# Patient Record
Sex: MALE | Race: WHITE | NOT HISPANIC OR LATINO | Employment: FULL TIME | URBAN - METROPOLITAN AREA
[De-identification: names, ages, dates, MRNs, and addresses within clinical notes are randomized per-mention and may not be internally consistent; named-entity substitution may affect disease eponyms.]

---

## 2018-03-29 ENCOUNTER — HOSPITAL ENCOUNTER (OUTPATIENT)
Dept: GENERAL RADIOLOGY | Facility: HOSPITAL | Age: 33
Discharge: HOME | End: 2018-03-29
Attending: INTERNAL MEDICINE | Admitting: INTERNAL MEDICINE

## 2018-03-29 DIAGNOSIS — J45.909 ASTHMATIC BRONCHITIS WITHOUT COMPLICATION, UNSPECIFIED ASTHMA SEVERITY, UNSPECIFIED WHETHER PERSISTENT: ICD-10-CM

## 2018-03-29 PROCEDURE — 71046 X-RAY EXAM CHEST 2 VIEWS: CPT

## 2021-09-14 ENCOUNTER — HOSPITAL ENCOUNTER (INPATIENT)
Facility: HOSPITAL | Age: 36
LOS: 3 days | Discharge: HOME | End: 2021-09-18
Attending: EMERGENCY MEDICINE | Admitting: INTERNAL MEDICINE

## 2021-09-14 ENCOUNTER — APPOINTMENT (OUTPATIENT)
Dept: GENERAL RADIOLOGY | Facility: HOSPITAL | Age: 36
End: 2021-09-14

## 2021-09-14 DIAGNOSIS — R52 INTRACTABLE PAIN: Primary | ICD-10-CM

## 2021-09-14 DIAGNOSIS — M54.50 ACUTE LOW BACK PAIN, UNSPECIFIED BACK PAIN LATERALITY, UNSPECIFIED WHETHER SCIATICA PRESENT: ICD-10-CM

## 2021-09-14 PROCEDURE — 99284 EMERGENCY DEPT VISIT MOD MDM: CPT

## 2021-09-14 PROCEDURE — 72072 X-RAY EXAM THORAC SPINE 3VWS: CPT

## 2021-09-14 PROCEDURE — 72110 X-RAY EXAM L-2 SPINE 4/>VWS: CPT

## 2021-09-14 RX ORDER — ONDANSETRON 2 MG/ML
4 INJECTION INTRAMUSCULAR; INTRAVENOUS ONCE
Status: COMPLETED | OUTPATIENT
Start: 2021-09-14 | End: 2021-09-15

## 2021-09-15 ENCOUNTER — APPOINTMENT (OUTPATIENT)
Dept: MRI IMAGING | Facility: HOSPITAL | Age: 36
End: 2021-09-15

## 2021-09-15 PROBLEM — F17.200 TOBACCO DEPENDENCE: Status: ACTIVE | Noted: 2021-09-15

## 2021-09-15 PROBLEM — R52 INTRACTABLE PAIN: Status: ACTIVE | Noted: 2021-09-15

## 2021-09-15 PROBLEM — R26.2 UNABLE TO AMBULATE: Status: ACTIVE | Noted: 2021-09-15

## 2021-09-15 PROBLEM — R52 ALTERATION IN MOBILITY ASSOCIATED WITH PAIN: Status: ACTIVE | Noted: 2021-09-15

## 2021-09-15 PROBLEM — M54.59 INTRACTABLE LOW BACK PAIN: Status: ACTIVE | Noted: 2021-09-15

## 2021-09-15 PROBLEM — Z78.9 ALTERATION IN MOBILITY ASSOCIATED WITH PAIN: Status: ACTIVE | Noted: 2021-09-15

## 2021-09-15 LAB
ANION GAP SERPL CALCULATED.3IONS-SCNC: 10.2 MMOL/L (ref 5–15)
BASOPHILS # BLD AUTO: 0.04 10*3/MM3 (ref 0–0.2)
BASOPHILS NFR BLD AUTO: 0.4 % (ref 0–1.5)
BUN SERPL-MCNC: 9 MG/DL (ref 6–20)
BUN/CREAT SERPL: 14.1 (ref 7–25)
CALCIUM SPEC-SCNC: 8.7 MG/DL (ref 8.6–10.5)
CHLORIDE SERPL-SCNC: 106 MMOL/L (ref 98–107)
CO2 SERPL-SCNC: 25.8 MMOL/L (ref 22–29)
CREAT SERPL-MCNC: 0.64 MG/DL (ref 0.76–1.27)
DEPRECATED RDW RBC AUTO: 43.4 FL (ref 37–54)
EOSINOPHIL # BLD AUTO: 0.29 10*3/MM3 (ref 0–0.4)
EOSINOPHIL NFR BLD AUTO: 2.5 % (ref 0.3–6.2)
ERYTHROCYTE [DISTWIDTH] IN BLOOD BY AUTOMATED COUNT: 12.2 % (ref 12.3–15.4)
GFR SERPL CREATININE-BSD FRML MDRD: 141 ML/MIN/1.73
GLUCOSE SERPL-MCNC: 91 MG/DL (ref 65–99)
HCT VFR BLD AUTO: 42.4 % (ref 37.5–51)
HGB BLD-MCNC: 13.5 G/DL (ref 13–17.7)
IMM GRANULOCYTES # BLD AUTO: 0.04 10*3/MM3 (ref 0–0.05)
IMM GRANULOCYTES NFR BLD AUTO: 0.4 % (ref 0–0.5)
LYMPHOCYTES # BLD AUTO: 2.87 10*3/MM3 (ref 0.7–3.1)
LYMPHOCYTES NFR BLD AUTO: 25.2 % (ref 19.6–45.3)
MCH RBC QN AUTO: 30.5 PG (ref 26.6–33)
MCHC RBC AUTO-ENTMCNC: 31.8 G/DL (ref 31.5–35.7)
MCV RBC AUTO: 95.9 FL (ref 79–97)
MONOCYTES # BLD AUTO: 0.83 10*3/MM3 (ref 0.1–0.9)
MONOCYTES NFR BLD AUTO: 7.3 % (ref 5–12)
NEUTROPHILS NFR BLD AUTO: 64.2 % (ref 42.7–76)
NEUTROPHILS NFR BLD AUTO: 7.31 10*3/MM3 (ref 1.7–7)
NRBC BLD AUTO-RTO: 0 /100 WBC (ref 0–0.2)
PLATELET # BLD AUTO: 203 10*3/MM3 (ref 140–450)
PMV BLD AUTO: 9.7 FL (ref 6–12)
POTASSIUM SERPL-SCNC: 3.9 MMOL/L (ref 3.5–5.2)
RBC # BLD AUTO: 4.42 10*6/MM3 (ref 4.14–5.8)
SARS-COV-2 RNA RESP QL NAA+PROBE: NOT DETECTED
SODIUM SERPL-SCNC: 142 MMOL/L (ref 136–145)
WBC # BLD AUTO: 11.38 10*3/MM3 (ref 3.4–10.8)

## 2021-09-15 PROCEDURE — 25010000002 ONDANSETRON PER 1 MG: Performed by: EMERGENCY MEDICINE

## 2021-09-15 PROCEDURE — 85025 COMPLETE CBC W/AUTO DIFF WBC: CPT | Performed by: PHYSICIAN ASSISTANT

## 2021-09-15 PROCEDURE — 80048 BASIC METABOLIC PNL TOTAL CA: CPT | Performed by: PHYSICIAN ASSISTANT

## 2021-09-15 PROCEDURE — 25010000002 HYDROMORPHONE PER 4 MG: Performed by: INTERNAL MEDICINE

## 2021-09-15 PROCEDURE — U0003 INFECTIOUS AGENT DETECTION BY NUCLEIC ACID (DNA OR RNA); SEVERE ACUTE RESPIRATORY SYNDROME CORONAVIRUS 2 (SARS-COV-2) (CORONAVIRUS DISEASE [COVID-19]), AMPLIFIED PROBE TECHNIQUE, MAKING USE OF HIGH THROUGHPUT TECHNOLOGIES AS DESCRIBED BY CMS-2020-01-R: HCPCS | Performed by: PHYSICIAN ASSISTANT

## 2021-09-15 PROCEDURE — G0378 HOSPITAL OBSERVATION PER HR: HCPCS

## 2021-09-15 PROCEDURE — 0 GADOBENATE DIMEGLUMINE 529 MG/ML SOLUTION: Performed by: INTERNAL MEDICINE

## 2021-09-15 PROCEDURE — A9577 INJ MULTIHANCE: HCPCS | Performed by: INTERNAL MEDICINE

## 2021-09-15 PROCEDURE — 25010000002 DEXAMETHASONE PER 1 MG: Performed by: PHYSICIAN ASSISTANT

## 2021-09-15 PROCEDURE — 25010000002 HYDROMORPHONE 1 MG/ML SOLUTION: Performed by: EMERGENCY MEDICINE

## 2021-09-15 PROCEDURE — 25010000002 KETOROLAC TROMETHAMINE PER 15 MG: Performed by: INTERNAL MEDICINE

## 2021-09-15 PROCEDURE — 63710000001 METHYLPREDNISOLONE 4 MG TABLET THERAPY PACK 21 EACH DISP PACK: Performed by: INTERNAL MEDICINE

## 2021-09-15 PROCEDURE — 72158 MRI LUMBAR SPINE W/O & W/DYE: CPT

## 2021-09-15 RX ORDER — DEXAMETHASONE SODIUM PHOSPHATE 10 MG/ML
10 INJECTION INTRAMUSCULAR; INTRAVENOUS ONCE
Status: COMPLETED | OUTPATIENT
Start: 2021-09-15 | End: 2021-09-15

## 2021-09-15 RX ORDER — VENLAFAXINE HYDROCHLORIDE 150 MG/1
150 CAPSULE, EXTENDED RELEASE ORAL
Status: DISCONTINUED | OUTPATIENT
Start: 2021-09-15 | End: 2021-09-18 | Stop reason: HOSPADM

## 2021-09-15 RX ORDER — METHYLPREDNISOLONE 4 MG/1
4 TABLET ORAL
Status: COMPLETED | OUTPATIENT
Start: 2021-09-17 | End: 2021-09-17

## 2021-09-15 RX ORDER — SODIUM CHLORIDE 0.9 % (FLUSH) 0.9 %
10 SYRINGE (ML) INJECTION AS NEEDED
Status: DISCONTINUED | OUTPATIENT
Start: 2021-09-15 | End: 2021-09-18 | Stop reason: HOSPADM

## 2021-09-15 RX ORDER — METHYLPREDNISOLONE 4 MG/1
4 TABLET ORAL
Status: DISCONTINUED | OUTPATIENT
Start: 2021-09-19 | End: 2021-09-18

## 2021-09-15 RX ORDER — KETOROLAC TROMETHAMINE 15 MG/ML
15 INJECTION, SOLUTION INTRAMUSCULAR; INTRAVENOUS EVERY 8 HOURS
Status: COMPLETED | OUTPATIENT
Start: 2021-09-15 | End: 2021-09-16

## 2021-09-15 RX ORDER — METHOCARBAMOL 750 MG/1
750 TABLET, FILM COATED ORAL ONCE
Status: COMPLETED | OUTPATIENT
Start: 2021-09-15 | End: 2021-09-15

## 2021-09-15 RX ORDER — METHYLPREDNISOLONE 4 MG/1
8 TABLET ORAL
Status: COMPLETED | OUTPATIENT
Start: 2021-09-16 | End: 2021-09-16

## 2021-09-15 RX ORDER — METHYLPREDNISOLONE 4 MG/1
24 TABLET ORAL ONCE
Status: COMPLETED | OUTPATIENT
Start: 2021-09-15 | End: 2021-09-15

## 2021-09-15 RX ORDER — KETOROLAC TROMETHAMINE 15 MG/ML
15 INJECTION, SOLUTION INTRAMUSCULAR; INTRAVENOUS EVERY 6 HOURS PRN
Status: DISCONTINUED | OUTPATIENT
Start: 2021-09-15 | End: 2021-09-15

## 2021-09-15 RX ORDER — METHOCARBAMOL 750 MG/1
750 TABLET, FILM COATED ORAL 3 TIMES DAILY
Status: DISCONTINUED | OUTPATIENT
Start: 2021-09-15 | End: 2021-09-18 | Stop reason: HOSPADM

## 2021-09-15 RX ORDER — ACETAMINOPHEN 325 MG/1
650 TABLET ORAL EVERY 4 HOURS PRN
Status: DISCONTINUED | OUTPATIENT
Start: 2021-09-15 | End: 2021-09-18 | Stop reason: HOSPADM

## 2021-09-15 RX ORDER — OXYCODONE AND ACETAMINOPHEN 7.5; 325 MG/1; MG/1
1 TABLET ORAL EVERY 4 HOURS PRN
Status: DISCONTINUED | OUTPATIENT
Start: 2021-09-15 | End: 2021-09-18 | Stop reason: HOSPADM

## 2021-09-15 RX ORDER — ONDANSETRON 2 MG/ML
4 INJECTION INTRAMUSCULAR; INTRAVENOUS EVERY 6 HOURS PRN
Status: DISCONTINUED | OUTPATIENT
Start: 2021-09-15 | End: 2021-09-18 | Stop reason: HOSPADM

## 2021-09-15 RX ORDER — METHYLPREDNISOLONE 4 MG/1
4 TABLET ORAL
Status: COMPLETED | OUTPATIENT
Start: 2021-09-16 | End: 2021-09-16

## 2021-09-15 RX ORDER — METHYLPREDNISOLONE 4 MG/1
4 TABLET ORAL
Status: DISCONTINUED | OUTPATIENT
Start: 2021-09-20 | End: 2021-09-18

## 2021-09-15 RX ORDER — HYDROMORPHONE HYDROCHLORIDE 1 MG/ML
0.5 INJECTION, SOLUTION INTRAMUSCULAR; INTRAVENOUS; SUBCUTANEOUS
Status: DISCONTINUED | OUTPATIENT
Start: 2021-09-15 | End: 2021-09-18

## 2021-09-15 RX ORDER — ALUMINA, MAGNESIA, AND SIMETHICONE 2400; 2400; 240 MG/30ML; MG/30ML; MG/30ML
15 SUSPENSION ORAL EVERY 6 HOURS PRN
Status: DISCONTINUED | OUTPATIENT
Start: 2021-09-15 | End: 2021-09-18 | Stop reason: HOSPADM

## 2021-09-15 RX ORDER — METHYLPREDNISOLONE 4 MG/1
4 TABLET ORAL
Status: COMPLETED | OUTPATIENT
Start: 2021-09-18 | End: 2021-09-18

## 2021-09-15 RX ORDER — ONDANSETRON 4 MG/1
4 TABLET, FILM COATED ORAL EVERY 6 HOURS PRN
Status: DISCONTINUED | OUTPATIENT
Start: 2021-09-15 | End: 2021-09-18 | Stop reason: HOSPADM

## 2021-09-15 RX ADMIN — KETOROLAC TROMETHAMINE 15 MG: 15 INJECTION, SOLUTION INTRAMUSCULAR; INTRAVENOUS at 15:08

## 2021-09-15 RX ADMIN — KETOROLAC TROMETHAMINE 15 MG: 15 INJECTION, SOLUTION INTRAMUSCULAR; INTRAVENOUS at 23:47

## 2021-09-15 RX ADMIN — METHOCARBAMOL TABLETS 750 MG: 750 TABLET, COATED ORAL at 16:09

## 2021-09-15 RX ADMIN — GADOBENATE DIMEGLUMINE 16 ML: 529 INJECTION, SOLUTION INTRAVENOUS at 11:48

## 2021-09-15 RX ADMIN — VENLAFAXINE HYDROCHLORIDE 150 MG: 150 CAPSULE, EXTENDED RELEASE ORAL at 12:33

## 2021-09-15 RX ADMIN — ONDANSETRON 4 MG: 2 INJECTION INTRAMUSCULAR; INTRAVENOUS at 00:29

## 2021-09-15 RX ADMIN — HYDROMORPHONE HYDROCHLORIDE 1 MG: 1 INJECTION, SOLUTION INTRAMUSCULAR; INTRAVENOUS; SUBCUTANEOUS at 00:29

## 2021-09-15 RX ADMIN — METHOCARBAMOL TABLETS 750 MG: 750 TABLET, COATED ORAL at 10:29

## 2021-09-15 RX ADMIN — HYDROMORPHONE HYDROCHLORIDE 1 MG: 1 INJECTION, SOLUTION INTRAMUSCULAR; INTRAVENOUS; SUBCUTANEOUS at 01:43

## 2021-09-15 RX ADMIN — OXYCODONE AND ACETAMINOPHEN 1 TABLET: 7.5; 325 TABLET ORAL at 17:45

## 2021-09-15 RX ADMIN — METHOCARBAMOL TABLETS 750 MG: 750 TABLET, COATED ORAL at 20:47

## 2021-09-15 RX ADMIN — DEXAMETHASONE SODIUM PHOSPHATE 10 MG: 10 INJECTION INTRAMUSCULAR; INTRAVENOUS at 04:40

## 2021-09-15 RX ADMIN — METHOCARBAMOL TABLETS 750 MG: 750 TABLET, COATED ORAL at 01:41

## 2021-09-15 RX ADMIN — OXYCODONE AND ACETAMINOPHEN 1 TABLET: 7.5; 325 TABLET ORAL at 23:47

## 2021-09-15 RX ADMIN — HYDROMORPHONE HYDROCHLORIDE 0.5 MG: 1 INJECTION, SOLUTION INTRAMUSCULAR; INTRAVENOUS; SUBCUTANEOUS at 09:22

## 2021-09-15 RX ADMIN — METHYLPREDNISOLONE 24 MG: 4 TABLET ORAL at 17:41

## 2021-09-15 RX ADMIN — OXYCODONE AND ACETAMINOPHEN 1 TABLET: 7.5; 325 TABLET ORAL at 12:33

## 2021-09-15 NOTE — ED NOTES
Patient was wearing facemask when I entered the room and throughout our encounter. I wore full protective equipment throughout this patient encounter including a N95, eye protection, and gloves. Hand hygiene was performed before donning protective equipment and after removal when leaving the room.       Arti Perez RN  09/15/21 0330

## 2021-09-15 NOTE — H&P
"    Patient Name:  Samir Enrique  YOB: 1985  MRN:  [id number]  Admit Date:  9/14/2021  Patient Care Team:  Rosalba Verde MD as PCP - General (Internal Medicine)      Subjective   History Present Illness     Chief Complaint   Patient presents with   • Back Pain       Mr. Enrique is a 36 y.o. male smoker with a benign medical history that presents to [facility] complaining of acute low back pain. Yesterday afternoon he reached down to pull off his sock when he heard a \"pop\" in his back. He had immediate pain to his low back. He has had difficulty moving due to the pain; he reports having to stay on the couch for \"5 hours\" until he could get to the ED. Denies injury/trauma/increased activity. Pain is present and rated 10/10 with any movement. It does radiate into his rt buttock but not down his leg. Denies recent fever, chest pain, shortness of breath, n/v, change in bowel/bladder, numbness/tingling. He was not able to ambulate in ED. He smokes 1 PPD; drinks etoh on occasion, not regularly. Denies illicit drug use.    Afebrile. HR controlled. BP stable. On room air. Covid neg. WBC 11.38, Hgb 13.5. BMP unremarkable. T spine xray: mild mid thoracic scoliosis convexed to rt; no acute findings. L spine xray: no acute findings      Review of Systems   Constitutional: Negative for fever.   HENT: Negative for congestion.    Respiratory: Negative for shortness of breath.    Cardiovascular: Negative for chest pain.   Gastrointestinal: Negative for nausea.   Genitourinary: Negative for difficulty urinating.   Musculoskeletal: Positive for back pain and gait problem.   Skin: Negative for rash.   Neurological: Negative for weakness and numbness.   Psychiatric/Behavioral: Negative for sleep disturbance.        Personal History     Past Medical History:   Diagnosis Date   • Bronchitis      Past Surgical History:   Procedure Laterality Date   • TYMPANOSTOMY TUBE PLACEMENT       No family history on " file.  Social History     Tobacco Use   • Smoking status: Current Every Day Smoker     Packs/day: 2.00   • Smokeless tobacco: Never Used   Substance Use Topics   • Alcohol use: Yes     Alcohol/week: 10.0 standard drinks     Types: 10 Cans of beer per week   • Drug use: No     No current facility-administered medications on file prior to encounter.     Current Outpatient Medications on File Prior to Encounter   Medication Sig Dispense Refill   • venlafaxine XR (EFFEXOR-XR) 150 MG 24 hr capsule Take 150 mg by mouth daily.     • Sulfamethoxazole-Trimethoprim (BACTRIM PO) Take  by mouth 2 (two) times a day.       Allergies   Allergen Reactions   • Ceclor [Cefaclor] Hives       Objective    Objective     Vital Signs  Temp:  [97.8 °F (36.6 °C)-98.1 °F (36.7 °C)] 97.8 °F (36.6 °C)  Heart Rate:  [63-80] 63  Resp:  [16] 16  BP: (122-134)/(75-82) 122/81  SpO2:  [95 %-98 %] 97 %  on   ;   Device (Oxygen Therapy): room air  Body mass index is 22.83 kg/m².    Physical Exam  Vitals and nursing note reviewed.   Constitutional:       General: He is not in acute distress.  HENT:      Head: Normocephalic.      Mouth/Throat:      Mouth: Mucous membranes are moist.   Eyes:      Conjunctiva/sclera: Conjunctivae normal.   Cardiovascular:      Rate and Rhythm: Normal rate and regular rhythm.   Pulmonary:      Effort: Pulmonary effort is normal. No respiratory distress.      Breath sounds: Normal breath sounds.   Abdominal:      General: Bowel sounds are normal.      Palpations: Abdomen is soft.   Musculoskeletal:      Cervical back: Neck supple.      Right lower leg: No edema.      Left lower leg: No edema.   Skin:     General: Skin is warm and dry.   Neurological:      Mental Status: He is alert and oriented to person, place, and time.      Motor: No weakness.   Psychiatric:         Mood and Affect: Mood normal.         Behavior: Behavior normal.         Results Review:  I reviewed the patient's new clinical results.  I reviewed the  patient's new imaging results and agree with the interpretation.  I reviewed the patient's other test results and agree with the interpretation  I personally viewed and interpreted the patient's EKG/Telemetry data    Lab Results (last 24 hours)     Procedure Component Value Units Date/Time    CBC & Differential [[id number]]  (Abnormal) Collected: 09/15/21 0325    Specimen: Blood Updated: 09/15/21 0344    Narrative:      The following orders were created for panel order CBC & Differential.  Procedure                               Abnormality         Status                     ---------                               -----------         ------                     CBC Auto Differential[[id number]]         Abnormal            Final result                 Please view results for these tests on the individual orders.    Basic Metabolic Panel [[id number]]  (Abnormal) Collected: 09/15/21 0325    Specimen: Blood Updated: 09/15/21 0349     Glucose 91 mg/dL      BUN 9 mg/dL      Creatinine 0.64 mg/dL      Sodium 142 mmol/L      Potassium 3.9 mmol/L      Chloride 106 mmol/L      CO2 25.8 mmol/L      Calcium 8.7 mg/dL      eGFR Non African Amer 141 mL/min/1.73      BUN/Creatinine Ratio 14.1     Anion Gap 10.2 mmol/L     Narrative:      GFR Normal >60  Chronic Kidney Disease <60  Kidney Failure <15      COVID PRE-OP / PRE-PROCEDURE SCREENING ORDER (NO ISOLATION) - Swab, Nasopharynx [[id number]]  (Normal) Collected: 09/15/21 0325    Specimen: Swab from Nasopharynx Updated: 09/15/21 0424    Narrative:      The following orders were created for panel order COVID PRE-OP / PRE-PROCEDURE SCREENING ORDER (NO ISOLATION) - Swab, Nasopharynx.  Procedure                               Abnormality         Status                     ---------                               -----------         ------                     COVID-19,[facility] ...[[id number]]  Normal              Final result                 Please view results for these tests on the  individual orders.    CBC Auto Differential [[id number]]  (Abnormal) Collected: 09/15/21 0325    Specimen: Blood Updated: 09/15/21 0344     WBC 11.38 10*3/mm3      RBC 4.42 10*6/mm3      Hemoglobin 13.5 g/dL      Hematocrit 42.4 %      MCV 95.9 fL      MCH 30.5 pg      MCHC 31.8 g/dL      RDW 12.2 %      RDW-SD 43.4 fl      MPV 9.7 fL      Platelets 203 10*3/mm3      Neutrophil % 64.2 %      Lymphocyte % 25.2 %      Monocyte % 7.3 %      Eosinophil % 2.5 %      Basophil % 0.4 %      Immature Grans % 0.4 %      Neutrophils, Absolute 7.31 10*3/mm3      Lymphocytes, Absolute 2.87 10*3/mm3      Monocytes, Absolute 0.83 10*3/mm3      Eosinophils, Absolute 0.29 10*3/mm3      Basophils, Absolute 0.04 10*3/mm3      Immature Grans, Absolute 0.04 10*3/mm3      nRBC 0.0 /100 WBC     COVID-19,[facility] CEPHEID/JOAN NP SWAB IN TRANSPORT MEDIA 8-12 HR TAT - Swab, Nasopharynx [[id number]]  (Normal) Collected: 09/15/21 0325    Specimen: Swab from Nasopharynx Updated: 09/15/21 0424     COVID19 Not Detected    Narrative:      Fact sheet for providers: https://www.fda.gov/media/987809/download     Fact sheet for patients: https://www.fda.gov/media/856798/download          Imaging Results (Last 24 Hours)     Procedure Component Value Units Date/Time    XR Spine Lumbar Complete 4+VW [[id number]] Collected: 09/15/21 0258     Updated: 09/15/21 0258    Narrative:        Patient: MARIS BLACKMON  Time Out: 02:57  Exam(s): FILM L SPINE 4+ VIEWS     EXAM:    XR Lumbosacral Spine, 4 or 5 Views    CLINICAL HISTORY:     Reason for exam: Lower back pain.    TECHNIQUE:    Frontal, lateral and oblique views of the lumbar spine.    COMPARISON:    No relevant prior studies available.    FINDINGS:    Vertebrae:  Unremarkable.  No fracture.  Normal alignment.    Sacrum coccyx:  Unremarkable as visualized.  No acute fracture.    Disc spaces:  No acute findings.  No significant narrowing.    Soft tissues:  Unremarkable.    IMPRESSION: No acute findings     Impression:          Electronically signed by Khurram Duran M.D. on 09-15-21 at 0257    XR Spine Thoracic 3 View [[id number]] Collected: 09/15/21 0256     Updated: 09/15/21 0256    Narrative:        Patient: MARIS ENRIQUE  Time Out: 02:56  Exam(s): FILM T SPINE     EXAM:    XR Thoracic Spine, 2 Views    CLINICAL HISTORY:     Reason for exam: Mid back pain.    TECHNIQUE:    Frontal and lateral views of the thoracic spine.    COMPARISON:    No relevant prior studies available.    FINDINGS:    Vertebrae:  Mild mid thoracic scoliosis convexed to the right.  No   fracture.  Normal alignment.    Disc spaces:  No acute findings.  No significant narrowing.    Soft tissues:  Unremarkable.    IMPRESSION:         No acute findings in the thoracic spine.      Impression:          Electronically signed by Khurram Duran M.D. on 09-15-21 at 0256              No orders to display        Assessment/Plan     Active Hospital Problems    Diagnosis  POA   • **Intractable low back pain [M54.5]  Yes   • Tobacco dependence [F17.200]  Yes   • Unable to ambulate [R26.2]  Yes      Resolved Hospital Problems   No resolved problems to display.       Mr. Enrique is a 36 y.o. male smoker with a benign medical history who is admitted for acute onset intractable low back pain    -MRI L spine. Analgesics as needed. Add toradol. PT eval. Xrays unremarkable  -Encourage smoking cessation. Offered nicotine patch but refusing at this time, no cravings  -Mild elevated WBC likely reactive. No signs/symptoms of infection. Will trend    · I discussed the patient's findings and my recommendations with patient and Dr. Reddy.    VTE Prophylaxis - SCDs.  Code Status - Full code.       RAYSHAWN Alvarado  [facility]  09/15/21  10:13 EDT

## 2021-09-15 NOTE — ED PROVIDER NOTES
The MERVAT and I have discussed this patients history, physical exam, and treatment plan. I have reviewed the documentation and personally had a face to face interaction with the patient. I affirm the documentation and agree with the treatment and plan.  The following note describes my personal findings    This patient is a 36-year-old male presenting to the emergency room complaining today of low back pain. The patient states that he tried to pull a sock off and felt significant pain in his lower back. He states that the pain is made worse by movement. He denies radiation of the pain into the legs, extremity weakness, groin numbness, urinary retention, or fecal incontinence.    Exam: This patient appears markedly uncomfortable but is currently without gross neurological deficit.  He is afebrile with stable vital signs and nontoxic in appearance.  Abdomen is soft and nontender, without rebound or guarding.  There is no tenderness to the thoracic or lumbar spines and he is without step-off or deformity noted.    Plan: We will make every attempt to get his pain under control with IV analgesia. Will obtain x-rays of the thoracic and lumbar spines. We will monitor and reassess following.      The patient was wearing a facemask upon entrance into the room and remained in such throughout their visit.  I was wearing PPE including a facemask, eye protection, as well as gloves at any point entering the room and throughout the visit     Elvin Vale MD  09/15/21 3511

## 2021-09-15 NOTE — CASE MANAGEMENT/SOCIAL WORK
Discharge Planning Assessment  [facility]     Patient Name: Samir Enrique  MRN: [id number]  Today's Date: 9/15/2021    Admit Date: 9/14/2021    Discharge Needs Assessment     Row Name 09/15/21 1409       Living Environment    Lives With  parent(s)    Current Living Arrangements  home/apartment/condo    Primary Care Provided by  self    Provides Primary Care For  no one    Family Caregiver if Needed  parent(s)    Quality of Family Relationships  helpful;involved;supportive    Able to Return to Prior Arrangements  yes       Resource/Environmental Concerns    Transportation Concerns  car, none       Transition Planning    Patient/Family Anticipates Transition to  home with family    Patient/Family Anticipated Services at Transition      Transportation Anticipated  family or friend will provide       Discharge Needs Assessment    Readmission Within the Last 30 Days  no previous admission in last 30 days    Equipment Currently Used at Home  none        Discharge Plan     Row Name 09/15/21 1410       Plan    Plan  Home with family support.    Patient/Family in Agreement with Plan  yes    Plan Comments  Spoke with the patient, verified curren tinformation and explained the role of the CCP. Patient said he lives with his parents and has family support. He's IADL and has no history with SNF/RH/HH. Patient plans to d/c home with family support. He denies needs for DME/RH/HH at this time. CCP will follow for appropriate d/c needs.        Continued Care and Services - Admitted Since 9/14/2021    Coordination has not been started for this encounter.         Demographic Summary     Row Name 09/15/21 1408       General Information    Admission Type  observation    Reason for Consult  discharge planning    Preferred Language  English     Used During This Interaction  no       Contact Information    Permission Granted to Share Info With  ;family/designee        Functional Status     Row Name  09/15/21 1408       Functional Status    Usual Activity Tolerance  good       Functional Status, IADL    Medications  independent    Meal Preparation  independent    Housekeeping  independent    Laundry  independent    Shopping  independent       Mental Status Summary    Recent Changes in Mental Status/Cognitive Functioning  no changes        Psychosocial     Row Name 09/15/21 1409       Intellectual Performance WDL    Level of Consciousness  Alert       Coping/Stress    Patient Personal Strengths  able to adapt    Sources of Support  parent(s)    Reaction to Health Status  accepting    Understanding of Condition and Treatment  adequate understanding of medical condition       Developmental Stage (Eriksson's)    Developmental Stage  Stage 7 (35-65 years/Middle Adulthood) Generativity vs. Stagnation        Abuse/Neglect    No documentation.       Legal    No documentation.       Substance Abuse    No documentation.       Patient Forms    No documentation.           Terri Purvis RN

## 2021-09-15 NOTE — ED PROVIDER NOTES
EMERGENCY DEPARTMENT ENCOUNTER    Room Number:  01/01  Date of encounter:  9/15/2021  PCP: Rosalba Verde MD  Historian: Patient      HPI:  Chief Complaint: Lower back pain  A complete HPI/ROS/PMH/PSH/SH/FH are unobtainable due to: Nothing    Context: Samir Enrique is a 36 y.o. male who presents to the ED c/o lower back pain that began just PTA.  Patient states he was bending over to pull sock off and felt a pop in his lower back.  Since that time he states he has been unable to move.  May still the pain is said to be tolerable with the slightest movement the pain is 10 out of 10, sharp, localized to the lower back.  The patient denies lower extremity weakness, numbness, tingling, saddle anesthesia, sciatica, urinary retention, bowel incontinence.  He denies fever, chills, IVD use, remote history of cancer.      PAST MEDICAL HISTORY  Active Ambulatory Problems     Diagnosis Date Noted   • No Active Ambulatory Problems     Resolved Ambulatory Problems     Diagnosis Date Noted   • No Resolved Ambulatory Problems     Past Medical History:   Diagnosis Date   • Bronchitis          PAST SURGICAL HISTORY  Past Surgical History:   Procedure Laterality Date   • TYMPANOSTOMY TUBE PLACEMENT           FAMILY HISTORY  No family history on file.      SOCIAL HISTORY  Social History     Socioeconomic History   • Marital status: Single     Spouse name: Not on file   • Number of children: Not on file   • Years of education: Not on file   • Highest education level: Not on file   Tobacco Use   • Smoking status: Current Every Day Smoker     Packs/day: 2.00   • Smokeless tobacco: Never Used   Substance and Sexual Activity   • Alcohol use: Yes     Alcohol/week: 10.0 standard drinks     Types: 10 Cans of beer per week   • Drug use: No         ALLERGIES  Ceclor [cefaclor]        REVIEW OF SYSTEMS  Review of Systems   Constitutional: Negative for chills and fever.   HENT: Negative.    Respiratory: Negative.    Cardiovascular: Negative.     Gastrointestinal: Negative.    Genitourinary: Negative.    Musculoskeletal: Positive for back pain. Negative for neck pain and neck stiffness.   Skin: Negative.    Neurological: Negative for dizziness and headaches.        All systems reviewed and negative except for those discussed in HPI.       PHYSICAL EXAM    I have reviewed the triage vital signs and nursing notes.    ED Triage Vitals   Temp Heart Rate Resp BP SpO2   09/14/21 2036 09/14/21 2034 09/14/21 2034 09/14/21 2034 09/14/21 2034   98.1 °F (36.7 °C) 80 16 133/75 98 %      Temp src Heart Rate Source Patient Position BP Location FiO2 (%)   -- -- -- -- --              Physical Exam  GENERAL: WDWN male appears uncomfortable but nontoxic  HENT: nares patent, face symmetric, mucous remains moist  EYES: no scleral icterus  CV: regular rhythm, regular rate, no rubs or gallop  RESPIRATORY: normal effort, lungs CTA B  ABDOMEN: soft, nontender  MUSCULOSKELETAL: TTP vicinity L3-S1, no step-off deformity.  Dorsiflexion plantarflexion of bilateral lower extremities 5 of 5 NEURO: alert and orient x4, moves all extremities, follows commands, gross sensation intact globally  SKIN: warm, dry        LAB RESULTS  No results found for this or any previous visit (from the past 24 hour(s)).    Ordered the above labs and independently reviewed the results.        RADIOLOGY  XR Spine Thoracic 3 View    Result Date: 9/15/2021  Patient: MARIS BLACKMON  Time Out: 02:56 Exam(s): FILM T SPINE EXAM:   XR Thoracic Spine, 2 Views CLINICAL HISTORY:    Reason for exam: Mid back pain. TECHNIQUE:   Frontal and lateral views of the thoracic spine. COMPARISON:   No relevant prior studies available. FINDINGS:   Vertebrae:  Mild mid thoracic scoliosis convexed to the right.  No fracture.  Normal alignment.   Disc spaces:  No acute findings.  No significant narrowing.   Soft tissues:  Unremarkable. IMPRESSION:       No acute findings in the thoracic spine.     Electronically signed by Khurram  SALUD Duran on 09-15-21 at 0256    XR Spine Lumbar Complete 4+VW    Result Date: 9/15/2021  Patient: MARIS BLACKMON  Time Out: 02:57 Exam(s): FILM L SPINE 4+ VIEWS EXAM:   XR Lumbosacral Spine, 4 or 5 Views CLINICAL HISTORY:    Reason for exam: Lower back pain. TECHNIQUE:   Frontal, lateral and oblique views of the lumbar spine. COMPARISON:   No relevant prior studies available. FINDINGS:   Vertebrae:  Unremarkable.  No fracture.  Normal alignment.   Sacrum coccyx:  Unremarkable as visualized.  No acute fracture.   Disc spaces:  No acute findings.  No significant narrowing.   Soft tissues:  Unremarkable. IMPRESSION: No acute findings    Electronically signed by Khurram Duran M.D. on 09-15-21 at 0257      I ordered the above noted radiological studies. Reviewed by me and discussed with radiologist.  See dictation for official radiology interpretation.      PROCEDURES    Procedures      MEDICATIONS GIVEN IN ER    Medications   sodium chloride 0.9 % flush 10 mL (has no administration in time range)   acetaminophen (TYLENOL) tablet 650 mg (has no administration in time range)   ondansetron (ZOFRAN) tablet 4 mg (has no administration in time range)     Or   ondansetron (ZOFRAN) injection 4 mg (has no administration in time range)   aluminum-magnesium hydroxide-simethicone (MAALOX MAX) 400-400-40 MG/5ML suspension 15 mL (has no administration in time range)   dexamethasone (DECADRON) injection 10 mg (has no administration in time range)   venlafaxine XR (EFFEXOR-XR) 24 hr capsule 150 mg (has no administration in time range)   HYDROmorphone (DILAUDID) injection 1 mg (1 mg Intravenous Given 9/15/21 0029)   ondansetron (ZOFRAN) injection 4 mg (4 mg Intravenous Given 9/15/21 0029)   HYDROmorphone (DILAUDID) injection 1 mg (1 mg Intravenous Given 9/15/21 0143)   methocarbamol (ROBAXIN) tablet 750 mg (750 mg Oral Given 9/15/21 0141)         PROGRESS, DATA ANALYSIS, CONSULTS, AND MEDICAL DECISION MAKING    All labs have been  independently reviewed by me.  All radiology studies have been reviewed by me and discussed with radiologist dictating the report.   EKG's independently viewed and interpreted by me.  Discussion below represents my analysis of pertinent findings related to patient's condition, differential diagnosis, treatment plan and final disposition.    DDx includes but is not limited to: Lumbosacral strain, herniated disc, spondylolisthesis, avulsion injury.  Will order an x-ray for further evaluation.  Please see below for course of care and timeline of events    ED Course as of Sep 15 0339   Wed Sep 15, 2021   0057 Patient unable to move or ambulate at this time.  Will order a second dose of IV Dilaudid as well as 750 mg of Robaxin.    [RC]   0230 Patient has had a second dose of Dilaudid, Robaxin and we have attempted to ambulate.  Patient is unable to get out of the bed at this time.  There is some concern  for herniated disc.  Given that the patient is unable to ambulate out of the emergency department within the emergency department I do not feel I can send him home with conservative measures.  We will place a call to medicine to discuss admission with the patient to be further evaluated and treated.    [RC]   0322 Patient states he missed his evening Effexor dose and is requesting it at this time.    [RC]      ED Course User Index  [RC] Gilmer Greer III, PA     Patient was placed in face mask in first look. Patient was wearing facemask when I entered the room and throughout our encounter. I wore full protective equipment throughout this patient encounter including a face mask, and gloves. Hand hygiene was performed before donning protective equipment and after removal when leaving the room.    AS OF 03:39 EDT VITALS:    BP - 133/75  HR - 80  TEMP - 98.1 °F (36.7 °C)  O2 SATS - 98%        DIAGNOSIS  Final diagnoses:   Intractable pain   Acute low back pain, unspecified back pain laterality, unspecified whether  sciatica present         DISPOSITION  ADMISSION    Discussed treatment plan and reason for admission with pt/family and admitting physician.  Pt/family voiced understanding of the plan for admission for further testing/treatment as needed.              Gilmer Greer III, PA  09/15/21 0330

## 2021-09-15 NOTE — ED NOTES
Patient presents to ED from home, notes he was in shower bent over and felt a pop to his middle back.  Patient notes pain to lumbar region, was a 10 out of 10, given fentanyl en route and 700 cc of fluid.  Patient is alert and oriented x4.  Denies any irregularities in bowel or bladder patterns.  Patient did have vagal response when transferring to stretcher, currently VSS.  NSR.  ABC's intact.  NAD noted. Patient wearing mask, nurse wearing mask, n95 and protective eyewear during care and assessment.  Hand hygiene performed prior to and post care.      Ruy Ghotra RN  09/14/21 2032

## 2021-09-15 NOTE — PLAN OF CARE
Goal Outcome Evaluation:           Progress: improving  Outcome Summary: 37 y/o Admitted for intractable lumbar pain. Pt on bed rest this shift. Pt voiding well per urinal. PIV saline locked. Pain managed via PO pills. Neuro checks WNL. MRI completed, see results. Medrol pack started. Educated pt on hospital policies. D/C plan pending. Will CTM.

## 2021-09-15 NOTE — PLAN OF CARE
Goal Outcome Evaluation:  Plan of Care Reviewed With: patient        Progress: no change  Outcome Summary: admitted from ED with intactable back pain that he reports is sharp pain in his mid lower back without radiation, moves all extremities well, no noted neuro deficits, dtv, call placed to internest for adm orders, discharge plans pending

## 2021-09-15 NOTE — ED NOTES
Nursing report ED to floor  Samir Enrique  36 y.o.  male    HPI (triage note):   Chief Complaint   Patient presents with   • Back Pain       Admitting doctor:   Cordell Marcelo MD    Admitting diagnosis:   The primary encounter diagnosis was Intractable pain. A diagnosis of Acute low back pain, unspecified back pain laterality, unspecified whether sciatica present was also pertinent to this visit.    Code status:   Current Code Status     Date Active Code Status Order ID Comments User Context       9/15/2021 0321 CPR [id number]  Scout Pete APRN ED     Advance Care Planning Activity      Questions for Current Code Status     Question Answer Comment    Code Status CPR     Medical Interventions (Level of Support Prior to Arrest) Full           Allergies:   Ceclor [cefaclor]    Weight:       09/15/21  0356   Weight: 75.8 kg (167 lb)       Most recent vitals:   Vitals:    09/14/21 2036 09/15/21 0341 09/15/21 0355 09/15/21 0356   BP:  134/82     Pulse:   72    Resp:       Temp: 98.1 °F (36.7 °C)      SpO2:   95%    Weight:    75.8 kg (167 lb)   Height:           Active LDAs/IV Access:   Lines, Drains & Airways    Active LDAs     Name:   Placement date:   Placement time:   Site:   Days:    Peripheral IV 09/14/21 2033 Anterior;Distal;Left;Upper Arm   09/14/21 2033    Arm   less than 1                Labs (abnormal labs have a star):   Labs Reviewed   BASIC METABOLIC PANEL - Abnormal; Notable for the following components:       Result Value    Creatinine 0.64 (*)     All other components within normal limits    Narrative:     GFR Normal >60  Chronic Kidney Disease <60  Kidney Failure <15     CBC WITH AUTO DIFFERENTIAL - Abnormal; Notable for the following components:    WBC 11.38 (*)     RDW 12.2 (*)     Neutrophils, Absolute 7.31 (*)     All other components within normal limits   COVID PRE-OP / PRE-PROCEDURE SCREENING ORDER (NO ISOLATION)    Narrative:     The following orders were created for panel order  COVID PRE-OP / PRE-PROCEDURE SCREENING ORDER (NO ISOLATION) - Swab, Nasopharynx.  Procedure                               Abnormality         Status                     ---------                               -----------         ------                     COVID-19, [facility]LENE IN-HOUSE ...[[id number]]                      In process                   Please view results for these tests on the individual orders.   COVID-19, MARLENE IN-HOUSE CEPHEID/JOAN, NP SWAB IN TRANSPORT MEDIA 8-12 HR TAT   CBC AND DIFFERENTIAL    Narrative:     The following orders were created for panel order CBC & Differential.  Procedure                               Abnormality         Status                     ---------                               -----------         ------                     CBC Auto Differential[[id number]]         Abnormal            Final result                 Please view results for these tests on the individual orders.       EKG:   No orders to display       Meds given in ED:   Medications   sodium chloride 0.9 % flush 10 mL (has no administration in time range)   acetaminophen (TYLENOL) tablet 650 mg (has no administration in time range)   ondansetron (ZOFRAN) tablet 4 mg (has no administration in time range)     Or   ondansetron (ZOFRAN) injection 4 mg (has no administration in time range)   aluminum-magnesium hydroxide-simethicone (MAALOX MAX) 400-400-40 MG/5ML suspension 15 mL (has no administration in time range)   dexamethasone (DECADRON) injection 10 mg (has no administration in time range)   venlafaxine XR (EFFEXOR-XR) 24 hr capsule 150 mg (has no administration in time range)   HYDROmorphone (DILAUDID) injection 1 mg (1 mg Intravenous Given 9/15/21 0029)   ondansetron (ZOFRAN) injection 4 mg (4 mg Intravenous Given 9/15/21 0029)   HYDROmorphone (DILAUDID) injection 1 mg (1 mg Intravenous Given 9/15/21 0143)   methocarbamol (ROBAXIN) tablet 750 mg (750 mg Oral Given 9/15/21 0141)       Imaging results:  XR Spine  Thoracic 3 View    Result Date: 9/15/2021  Electronically signed by Khurram Duran M.D. on 09-15-21 at 0256    XR Spine Lumbar Complete 4+VW    Result Date: 9/15/2021  Electronically signed by Khurram Duran M.D. on 09-15-21 at 0257      Ambulatory status:   Ad travis    Social issues:   Social History     Socioeconomic History   • Marital status: Single     Spouse name: Not on file   • Number of children: Not on file   • Years of education: Not on file   • Highest education level: Not on file   Tobacco Use   • Smoking status: Current Every Day Smoker     Packs/day: 2.00   • Smokeless tobacco: Never Used   Substance and Sexual Activity   • Alcohol use: Yes     Alcohol/week: 10.0 standard drinks     Types: 10 Cans of beer per week   • Drug use: No    Nursing report ED to floor     Gabriela Mustafa RN  09/15/21 0359

## 2021-09-16 LAB
DEPRECATED RDW RBC AUTO: 41.2 FL (ref 37–54)
ERYTHROCYTE [DISTWIDTH] IN BLOOD BY AUTOMATED COUNT: 11.8 % (ref 12.3–15.4)
HCT VFR BLD AUTO: 40.5 % (ref 37.5–51)
HGB BLD-MCNC: 13.2 G/DL (ref 13–17.7)
MCH RBC QN AUTO: 30.8 PG (ref 26.6–33)
MCHC RBC AUTO-ENTMCNC: 32.6 G/DL (ref 31.5–35.7)
MCV RBC AUTO: 94.4 FL (ref 79–97)
PLATELET # BLD AUTO: 212 10*3/MM3 (ref 140–450)
PMV BLD AUTO: 9.9 FL (ref 6–12)
RBC # BLD AUTO: 4.29 10*6/MM3 (ref 4.14–5.8)
WBC # BLD AUTO: 9.89 10*3/MM3 (ref 3.4–10.8)

## 2021-09-16 PROCEDURE — 97110 THERAPEUTIC EXERCISES: CPT

## 2021-09-16 PROCEDURE — 85027 COMPLETE CBC AUTOMATED: CPT | Performed by: NURSE PRACTITIONER

## 2021-09-16 PROCEDURE — 25010000002 KETOROLAC TROMETHAMINE PER 15 MG: Performed by: INTERNAL MEDICINE

## 2021-09-16 PROCEDURE — 99254 IP/OBS CNSLTJ NEW/EST MOD 60: CPT | Performed by: NURSE PRACTITIONER

## 2021-09-16 PROCEDURE — 97165 OT EVAL LOW COMPLEX 30 MIN: CPT | Performed by: OCCUPATIONAL THERAPIST

## 2021-09-16 PROCEDURE — 63710000001 METHYLPREDNISOLONE 4 MG TABLET THERAPY PACK 21 EACH DISP PACK: Performed by: INTERNAL MEDICINE

## 2021-09-16 PROCEDURE — 97161 PT EVAL LOW COMPLEX 20 MIN: CPT

## 2021-09-16 RX ADMIN — VENLAFAXINE HYDROCHLORIDE 150 MG: 150 CAPSULE, EXTENDED RELEASE ORAL at 08:50

## 2021-09-16 RX ADMIN — METHOCARBAMOL TABLETS 750 MG: 750 TABLET, COATED ORAL at 17:17

## 2021-09-16 RX ADMIN — KETOROLAC TROMETHAMINE 15 MG: 15 INJECTION, SOLUTION INTRAMUSCULAR; INTRAVENOUS at 06:28

## 2021-09-16 RX ADMIN — OXYCODONE AND ACETAMINOPHEN 1 TABLET: 7.5; 325 TABLET ORAL at 06:28

## 2021-09-16 RX ADMIN — METHOCARBAMOL TABLETS 750 MG: 750 TABLET, COATED ORAL at 08:50

## 2021-09-16 RX ADMIN — METHYLPREDNISOLONE 4 MG: 4 TABLET ORAL at 13:04

## 2021-09-16 RX ADMIN — METHYLPREDNISOLONE 4 MG: 4 TABLET ORAL at 17:17

## 2021-09-16 RX ADMIN — METHOCARBAMOL TABLETS 750 MG: 750 TABLET, COATED ORAL at 21:41

## 2021-09-16 RX ADMIN — METHYLPREDNISOLONE 8 MG: 4 TABLET ORAL at 21:41

## 2021-09-16 RX ADMIN — METHYLPREDNISOLONE 4 MG: 4 TABLET ORAL at 08:50

## 2021-09-16 NOTE — PLAN OF CARE
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 His pain level is better today than yesterday per pt report as yesterday he couldn't get to EOB, today he completed bed mobility w VC on technique for log roll and SBA, stood w. CGA to SBA, walked w. feet close together to window and back to bed. Pt plans to dc home to his s house. Pt states his mom has a shower chair he could borrow that she doesn't need right now. Cont OT to incr ADL, strength, balance, and tsf. Recommend dc home w. GF at DC.  OT wore all PPE , washed hands before/after. Pt wore mask.

## 2021-09-16 NOTE — PROGRESS NOTES
Name: Samir Enrique ADMIT: 2021   : 1985  PCP: Rosalba Verde MD    MRN: [id number] LOS: 0 days   AGE/SEX: 36 y.o. male  ROOM: [facility]   Subjective   Chief Complaint   Patient presents with   • Back Pain     Pain still moderate  Partially improved with meds  Had not yet ambulated this morning  Had MRI L spine    ROS  No f/c  No n/v  No cp/palp  No soa/cough    Objective   Vital Signs  Temp:  [97.9 °F (36.6 °C)-98.6 °F (37 °C)] 98.2 °F (36.8 °C)  Heart Rate:  [59-75] 75  Resp:  [18] 18  BP: (106-125)/(63-76) 125/76  SpO2:  [97 %-100 %] 100 %  on   ;   Device (Oxygen Therapy): room air  Body mass index is 22.83 kg/m².    Physical Exam  Constitutional:       Appearance: He is well-developed.   HENT:      Head: Normocephalic and atraumatic.   Eyes:      General: No scleral icterus.  Cardiovascular:      Rate and Rhythm: Normal rate and regular rhythm.      Heart sounds: Normal heart sounds.   Pulmonary:      Effort: Pulmonary effort is normal. No respiratory distress.      Breath sounds: Normal breath sounds.   Abdominal:      General: There is no distension.      Palpations: Abdomen is soft.      Tenderness: There is no abdominal tenderness.   Musculoskeletal:      Cervical back: Neck supple.   Neurological:      Mental Status: He is alert.   Psychiatric:         Behavior: Behavior normal.         Results Review:       I reviewed the patient's new clinical results.  Results from last 7 days   Lab Units 21  0643 09/15/21  0325   WBC 10*3/mm3 9.89 11.38*   HEMOGLOBIN g/dL 13.2 13.5   PLATELETS 10*3/mm3 212 203     Results from last 7 days   Lab Units 09/15/21  0325   SODIUM mmol/L 142   POTASSIUM mmol/L 3.9   CHLORIDE mmol/L 106   CO2 mmol/L 25.8   BUN mg/dL 9   CREATININE mg/dL 0.64*   GLUCOSE mg/dL 91   Estimated Creatinine Clearance: 175.5 mL/min (A) (by C-G formula based on SCr of 0.64 mg/dL (L)).    Results from last 7 days   Lab Units 09/15/21  0325   CALCIUM mg/dL 8.7         Coag     HbA1C No  results found for: HGBA1C  Infection     Radiology(recent) XR Spine Thoracic 3 View    Result Date: 9/15/2021  Electronically signed by Khurram Duran M.D. on 09-15-21 at 0256    XR Spine Lumbar Complete 4+VW    Result Date: 9/15/2021  Electronically signed by Khurram Duran M.D. on 09-15-21 at 0257    MRI Lumbar Spine With & Without Contrast    Result Date: 9/15/2021  Transitional anatomy is appreciated with what is being considered to be lumbarization of S1. Careful correlation prior to any intervention is required given the presence of transitional anatomy. There is rotatory levoscoliosis of the lumbar spine and multilevel facet degenerative disease. Broad-based disc osteophyte complexes are noted as described above, most prominent of which is at L4-L5 which results in mild canal stenosis. There is no evidence of marrow edema to suggest fracture. See above.      No results found for: TROPONINT, TROPONINI, BNP  No components found for: TSH;2    methocarbamol, 750 mg, Oral, TID  methylPREDNISolone, 4 mg, Oral, TID Around Food  [START ON 9/17/2021] methylPREDNISolone, 4 mg, Oral, 4x Daily Taper  [START ON 9/18/2021] methylPREDNISolone, 4 mg, Oral, TID Around Food  [START ON 9/19/2021] methylPREDNISolone, 4 mg, Oral, Before Breakfast  [START ON 9/19/2021] methylPREDNISolone, 4 mg, Oral, Tonight  [START ON 9/20/2021] methylPREDNISolone, 4 mg, Oral, Before Breakfast  methylPREDNISolone, 8 mg, Oral, Tonight  venlafaxine XR, 150 mg, Oral, Daily With Breakfast       Diet Regular      Assessment/Plan      Active Hospital Problems    Diagnosis  POA   • **Intractable low back pain [M54.5]  Yes   • Tobacco dependence [F17.200]  Yes   • Unable to ambulate [R26.2]  Yes   • Intractable pain [R52]  Yes      Resolved Hospital Problems   No resolved problems to display.       · Continue measures for pain control  · Steroids and NSAIDs added  · MRI L spine without clear source of symptoms- have consulted SAMIRA who have ordered MRI T and C  spine  · Above medications      DW RN      Chico Reddy MD  [facility]  09/16/21  16:15 EDT

## 2021-09-16 NOTE — PLAN OF CARE
Goal Outcome Evaluation:           Progress: no change   Pt admitted from the ER on Wed morning with complaints of intractable back pain. Pt said he bent down and fell a pop. Pt continues with bedrest due to increased pain. Pt continues with IV and PO pain meds. Pt voiding via the urinal. Voiding function intact. CIWA in place. Pt educated on the benefits of quitting smoking and its benefits for his recovery. Pt voiced understanding. Pt is resting at this time, will continue to monitor.

## 2021-09-16 NOTE — CONSULTS
"[facility]   Consult Note    Patient Name: Samir Enrique  : 1985  MRN: [id number]  Primary Care Physician:  Rosalba Verde MD  Referring Physician: Cordell Marcelo MD  Date of admission: 2021    Inpatient Neurosurgery Consult  Consult performed by: Stephanie Coleman APRN  Consult ordered by: Chico Reddy MD  Reason for consult: intractable back pain         Subjective   Subjective     Reason for Consult/ Chief Complaint: Intractable back pain    History of Present Illness  Samir Enrique is a 36 y.o. male with no history of prior spine surgery who presented to [facility] emergency department last evening for complaints of severe, intractable back pain. The patient stated that he went laundry room after getting out of shower to retrieve some clothes. As he was leaning over to put his R leg into his underwear he felt an immediate \"pop\" and severe pain through the neck, thoracic and lumbar spine. He could not stand up but was able to \"stumble\" to a nearby couch. He laid there for 5 hours. Any attempt to move worsened his pain. He was able to get his father's attention and EMS was called to bring him to the ER. He reports some improvement since being started on muscle relaxers. His pain now seems to have settled in the R lower lumbar region just above the R buttock. He also notes a history of thoracic pain which worsens with prolonged sitting and standing. He reports the pain causes him to slouch. He notes no prior injury to his spine but did note an episode in which he woke up one morning 10 years ago and could not move his legs. He was brought to the ER. After approximately 6 hours his leg functioned returned to normal. The patient notes some episodes of spasticity in the legs. He denies any sense of heaviness or sensory loss in his legs. He does note that his legs will jerk/tremor in his lower extremities with rolling over in bed, etc.  This does not occur all the time.  The " patient also denies any bowel or bladder incontinence.  He denies any upper lower extremity weakness.      MRI of the lumbar spine with and without contrast has been performed.  We were asked by the hospitalist to evaluate the patient and make further recommendations.      Review of Systems   Constitutional: Positive for activity change.   HENT: Negative.    Eyes: Negative.    Respiratory: Negative.    Cardiovascular: Negative.    Gastrointestinal: Negative.    Endocrine: Negative.    Genitourinary: Negative for difficulty urinating, dysuria and flank pain.   Musculoskeletal: Positive for back pain and gait problem.   Skin: Negative.    Neurological: Positive for weakness.   Hematological: Negative.    Psychiatric/Behavioral: Negative.         Personal History     Past Medical History:   Diagnosis Date   • Bronchitis        Past Surgical History:   Procedure Laterality Date   • TYMPANOSTOMY TUBE PLACEMENT         Family History: family history is not on file. Otherwise pertinent FHx was reviewed and not pertinent to current issue.    Social History:  reports that he has been smoking. He has been smoking about 2.00 packs per day. He has never used smokeless tobacco. He reports current alcohol use of about 10.0 standard drinks of alcohol per week. He reports that he does not use drugs.    Home Medications:   Sulfamethoxazole-Trimethoprim and venlafaxine XR    Allergies:  Allergies   Allergen Reactions   • Ceclor [Cefaclor] Hives       Objective    Objective     Vitals:  Temp:  [97.9 °F (36.6 °C)-98.6 °F (37 °C)] 98.2 °F (36.8 °C)  Heart Rate:  [59-75] 75  Resp:  [18] 18  BP: (106-125)/(63-76) 125/76    Physical Exam  Vitals reviewed.   Constitutional:       General: He is not in acute distress.     Appearance: Normal appearance. He is well-developed. He is not ill-appearing or diaphoretic.   HENT:      Head: Normocephalic and atraumatic.      Nose: Nose normal.      Mouth/Throat:      Mouth: Mucous membranes are  moist.      Pharynx: Oropharynx is clear.   Eyes:      General:         Right eye: No discharge.         Left eye: No discharge.      Conjunctiva/sclera: Conjunctivae normal.   Neck:      Trachea: No tracheal deviation.   Cardiovascular:      Rate and Rhythm: Normal rate.   Pulmonary:      Effort: Pulmonary effort is normal. No respiratory distress.   Abdominal:      General: Abdomen is flat. There is no distension.      Palpations: Abdomen is soft.      Tenderness: There is no abdominal tenderness.   Musculoskeletal:         General: Tenderness (to palpation in the R lower lumbar region just above the buttock. ) present. Normal range of motion.      Cervical back: Normal range of motion and neck supple. No rigidity.      Right lower leg: No edema.      Left lower leg: No edema.   Skin:     General: Skin is warm and dry.      Findings: No erythema.   Neurological:      Mental Status: He is alert and oriented to person, place, and time.      GCS: GCS eye subscore is 4. GCS verbal subscore is 5. GCS motor subscore is 6.      Sensory: No sensory deficit.      Motor: Weakness present. No abnormal muscle tone.      Coordination: Coordination normal.      Deep Tendon Reflexes: Reflexes are normal and symmetric. Reflexes normal.      Comments: No motor or sensory deficits.Difficulty to assess as any movement of legs brings about severe back pian. DTR's abnormal. Positive Almanzar's on the left; negative clonus bilaterally.       Psychiatric:         Behavior: Behavior is cooperative.         Thought Content: Thought content normal.       Result Review    Result Review:  I have personally reviewed the results from the time of this admission to 9/16/2021 15:48 EDT and agree with these findings:  []  Laboratory  []  Microbiology  [x]  Radiology  []  EKG/Telemetry   []  Cardiology/Vascular   []  Pathology  []  Old records  []  Other:  Most notable findings include:     MRI LUMBAR SPINE W/WO CONTRAST 9/15/2021    Transitional  anatomy with lumbarized S1 segment. Leftward scoliosis w apex at L3/4.  Degenerative osteophyte complexes noted L3-4, L4-5 and L5-S1.  There is also degenerative facet arthropathy at the above-stated levels.  There is mild stenosis at L4-5.  No evidence of severe canal stenosis.    Results from last 7 days   Lab Units 09/16/21  0643 09/15/21  0325   WBC 10*3/mm3 9.89 11.38*   HEMOGLOBIN g/dL 13.2 13.5   HEMATOCRIT % 40.5 42.4   PLATELETS 10*3/mm3 212 203     .  Results from last 7 days   Lab Units 09/15/21  0325   SODIUM mmol/L 142   POTASSIUM mmol/L 3.9   CHLORIDE mmol/L 106   CO2 mmol/L 25.8   BUN mg/dL 9   CREATININE mg/dL 0.64*   GLUCOSE mg/dL 91   CALCIUM mg/dL 8.7       Assessment/Plan   Assessment / Plan     Brief Patient Summary:  Samir Enrique is a 36 y.o. male who developed severe, unrelenting lumbar pain after bending over to  a sock.  He presented to the emergency department at [facility].  MRI of the lumbar spine has been performed.  The patient has been started on oral dose steroids and muscle relaxers. Neurosurgery has been asked to evaluate the patient.    Active Hospital Problems:  Active Hospital Problems    Diagnosis    • **Intractable low back pain    • Tobacco dependence    • Unable to ambulate      Plan:     I have reviewed the MRI images of the lumbar spine with Dr. King.  There is clearly no severe pathology to explain the patient's current symptoms.  I am most concerned about the hyperreflexia on today's exam as well as the history of inability to stand or walk approximately 10 years ago for an unknown reason.  He states that those symptoms resolved in approximately 6 hours after he presented to the emergency department.  I will get an MRI of the cervical and thoracic spine just to be sure that there is no pathology there such ase a syrinx or severe cord compression.    Continue current dose steroids and muscle relaxers.  We will make further recommendations once the  MRI studies are complete.    Electronically signed by RAYSHAWN Banegas, 09/16/21, 1:12 PM EDT.

## 2021-09-16 NOTE — THERAPY EVALUATION
Patient Name: Samir Enrique  : 1985    MRN: [id number]                              Today's Date: 2021       Admit Date: 2021    Visit Dx:     ICD-10-CM ICD-9-CM   1. Intractable pain  R52 780.96   2. Acute low back pain, unspecified back pain laterality, unspecified whether sciatica present  M54.5 724.2     Patient Active Problem List   Diagnosis   • Intractable pain   • Intractable low back pain   • Tobacco dependence   • Unable to ambulate     Past Medical History:   Diagnosis Date   • Bronchitis      Past Surgical History:   Procedure Laterality Date   • TYMPANOSTOMY TUBE PLACEMENT       General Information     Row Name 21 1300          OT Time and Intention    Document Type  evaluation  -     Mode of Treatment  individual therapy;occupational therapy  -     Row Name 21 1300          General Information    Patient Profile Reviewed  yes  -     Prior Level of Function  independent:;community mobility;all household mobility;gait;transfer;bed mobility;ADL's  -     Existing Precautions/Restrictions  fall due to back pain and had spasms before admit  -     Barriers to Rehab  none identified  -KP     Row Name 21 1300          Living Environment    Lives With  alone but can stay with his GF  -KP     Row Name 21 1300          Home Main Entrance    Number of Stairs, Main Entrance  three  -KP     Row Name 21 1300          Cognition    Orientation Status (Cognition)  oriented x 4  -KP     Row Name 21 1300          Safety Issues, Functional Mobility    Impairments Affecting Function (Mobility)  strength;endurance/activity tolerance  -     Comment, Safety Issues/Impairments (Mobility)  pain w movement about a 3-4  -KP       User Key  (r) = Recorded By, (t) = Taken By, (c) = Cosigned By    Initials Name Provider Type    Annie Clifford OTR Occupational Therapist          Mobility/ADL's     Row Name 21 1302          Bed Mobility    Bed Mobility   bed mobility (all) activities;rolling right;rolling left  -     All Activities, [place] (Bed Mobility)  set up;standby assist;verbal cues  -     Rolling Left [place] (Bed Mobility)  set up;verbal cues;standby assist  -     Comment (Bed Mobility)  ed pt on log roll, and bed mobility technique w back pain to help not incr back pain  -     Row Name 09/16/21 1302          Transfers    Transfers  sit-stand transfer  -     Sit-Stand [place] (Transfers)  set up;contact guard  -[facility] Name 09/16/21 1302          Functional Mobility    Functional Mobility- Ind. Level  supervision required;contact guard assist  -     Functional Mobility- Comment  walked in room to window, to bed CGA to SBA, slow gait, guarded due to pain, first time up since admit. walks w feet close together at times.  -       User Key  (r) = Recorded By, (t) = Taken By, (c) = Cosigned By    Initials Name Provider Type     Annie Cardenas, OTR Occupational Therapist        Obj/Interventions     Row Name 09/16/21 1304          Sensory Assessment (Somatosensory)    Sensory Assessment (Somatosensory)  sensation intact  -[facility] Name 09/16/21 1304          Vision Assessment/Intervention    Visual Impairment/Limitations  WFL  -[facility] Name 09/16/21 1304          Range of Motion Comprehensive    General Range of Motion  no range of motion deficits identified  -     Comment, General Range of Motion  B UE 8/8  -[facility] Name 09/16/21 1304          Strength Comprehensive (MMT)    General Manual Muscle Testing (MMT) Assessment  upper extremity strength deficits identified  -     Comment, General Manual Muscle Testing (MMT) Assessment  B UE 4/5  -[facility] Name 09/16/21 1304          Motor Skills    Motor Skills  coordination;functional endurance  -     Coordination  WFL  -     Functional Endurance  fair +  -[facility] Name 09/16/21 1304          Balance    Balance Assessment  sitting static balance;standing  static balance  -KP     Static Sitting Balance  WFL  -KP     Static Standing Balance  mild impairment  -KP     Balance Interventions  sitting;standing;sit to stand;supported;static  -KP     Comment, Balance  walks well, but [facility]HA to [facility] for safety at times due to pain, had spasms yesterday per pt report and couldn't get OOB at all yesterday  -       User Key  (r) = Recorded By, (t) = Taken By, (c) = Cosigned By    Initials Name Provider Type    Annie Clifford OTR Occupational Therapist        Goals/Plan     Row Name 09/16/21 1307          Transfer Goal 1 (OT)    Activity/Assistive Device (Transfer Goal 1, OT)  sit-to-stand/stand-to-sit;toilet;bed-to-chair/chair-to-bed  -KP     [place] Level/Cues Needed (Transfer Goal 1, OT)  modified independence  -KP     Time Frame (Transfer Goal 1, OT)  short term goal (STG);1 week  -KP     Progress/Outcome (Transfer Goal 1, OT)  goal ongoing  [facility]     Row Name 09/16/21 1307          Bathing Goal 1 (OT)    Activity/Device (Bathing Goal 1, OT)  bathing skills, all  -KP     [place] Level/Cues Needed (Bathing Goal 1, OT)  modified independence  -KP     Time Frame (Bathing Goal 1, OT)  short term goal (STG);1 week  -KP     Progress/Outcomes (Bathing Goal 1, OT)  goal ongoing  [facility]     Row Name 09/16/21 1307          Strength Goal 1 (OT)    Strength Goal 1 (OT)  incr B UE to 4+/5  -KP     Time Frame (Strength Goal 1, OT)  short term goal (STG);1 week  -KP     Progress/Outcome (Strength Goal 1, OT)  goal ongoing  [facility]     Row Name 09/16/21 1307          Therapy Assessment/Plan (OT)    Planned Therapy Interventions (OT)  activity tolerance training;functional balance retraining;occupation/activity based interventions;BADL retraining;transfer/mobility retraining;strengthening exercise;ROM/therapeutic exercise  -       User Key  (r) = Recorded By, (t) = Taken By, (c) = Cosigned By    Initials Name Provider Type    Annie Clifford OTR Occupational Therapist         Clinical Impression     Row Name 09/16/21 1305          Pain Assessment    Additional Documentation  Pain Scale: Numbers Pre/Post-Treatment (Group)  -     Row Name 09/16/21 1305          Pain Scale: Numbers Pre/Post-Treatment    Pretreatment Pain Rating  3/10  -KP     Posttreatment Pain Rating  3/10  -     Pain Location - Side  Bilateral  -     Pain Location - Orientation  lower  -     Pain Location  back  -     Pain Intervention(s)  Repositioned;Medication (See MAR)  -     Row Name 09/16/21 1302          Plan of Care Review    Plan of Care Reviewed With  patient  -     Progress  improving  -     Outcome Summary  pt admitted from home where he was independent PTA and 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 His pain level is better today than yesterday per pt report as yesterday he couldn't get to EOB, today he completed bed mobility w VC on technique for log roll and SBA, stood w. CGA to SBA, walked w. feet close together to window and back to bed. Pt plans to dc home to his s house. Pt states his mom has a shower chair he could borrow that she doesn't need right now. Cont OT to incr ADL, strength, balance, and tsf. Recommend dc home w. GF at DC.  -     Row Name 09/16/21 1307          Therapy Assessment/Plan (OT)    Rehab Potential (OT)  good, to achieve stated therapy goals  -     Criteria for Skilled Therapeutic Interventions Met (OT)  yes;meets criteria;skilled treatment is necessary  -     Therapy Frequency (OT)  5 times/wk  -     Row Name 09/16/21 1301          Therapy Plan Review/Discharge Plan (OT)    Anticipated Discharge Disposition (OT)  home with assist  -     Row Name 09/16/21 130          Positioning and Restraints    Pre-Treatment Position  in bed  -     Post Treatment Position  bed  -KP     In Bed  supine;call light within reach;encouraged to call for assist;exit alarm on notified nsg aide  -       User Key  (r) = Recorded By, (t) = Taken By, (c) = Cosigned By     Initials Name Provider Type    Annie Clifford OTR Occupational Therapist        Outcome Measures     Row Name 09/16/21 1311          How much help from another is currently needed...    Putting on and taking off regular lower body clothing?  3  -KP     Bathing (including washing, rinsing, and drying)  3  -KP     Toileting (which includes using toilet bed pan or urinal)  3  -KP     Putting on and taking off regular upper body clothing  3  -KP     Taking care of personal grooming (such as brushing teeth)  3  -KP     Eating meals  3  -KP     AM-PAC 6 Clicks Score (OT)  18  -KP     Row Name 09/16/21 1311          Functional Assessment    Outcome Measure Options  AM-PAC 6 Clicks Daily Activity (OT)  -       User Key  (r) = Recorded By, (t) = Taken By, (c) = Cosigned By    Initials Name Provider Type    Annie Clifford OTR Occupational Therapist          Occupational Therapy Education                 Title: PT OT SLP Therapies (Done)     Topic: Occupational Therapy (Done)     Point: ADL training (Done)     Description:   Instruct learner(s) on proper safety adaptation and remediation techniques during self care or transfers.   Instruct in proper use of assistive devices.              Learning Progress Summary           Patient Acceptance, E,TB,D, DU,VU by  at 9/16/2021 1312    Comment: ed pt on role of OT. benefit of therapy, POC w. OT. ed on safety w. ADLs and tsf. ed on safety w shower tsf and to have GF there when he gets in/out for safety and shower chair due to back pain.                   Point: Precautions (Done)     Description:   Instruct learner(s) on prescribed precautions during self-care and functional transfers.              Learning Progress Summary           Patient Acceptance, E,TB,D, DU,VU by  at 9/16/2021 1312    Comment: ed pt on role of OT. benefit of therapy, POC w. OT. ed on safety w. ADLs and tsf. ed on safety w shower tsf and to have GF there when he gets in/out for  safety and shower chair due to back pain.                   Point: Body mechanics (Done)     Description:   Instruct learner(s) on proper positioning and spine alignment during self-care, functional mobility activities and/or exercises.              Learning Progress Summary           Patient Acceptance, E,TB,D, DU,VU by  at 9/16/2021 1312    Comment: ed pt on role of OT. benefit of therapy, POC w. OT. ed on safety w. ADLs and tsf. ed on safety w shower tsf and to have GF there when he gets in/out for safety and shower chair due to back pain.                               User Key     Initials Effective Dates Name Provider Type Discipline     06/16/21 -  Annie Cardenas, OTR Occupational Therapist OT              OT Recommendation and Plan  Planned Therapy Interventions (OT): activity tolerance training, functional balance retraining, occupation/activity based interventions, BADL retraining, transfer/mobility retraining, strengthening exercise, ROM/therapeutic exercise  Therapy Frequency (OT): 5 times/wk  Plan of Care Review  Plan of Care Reviewed With: patient  Progress: improving  Outcome Summary: pt admitted from home where he was independent PTA and 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 His pain level is better today than yesterday per pt report as yesterday he couldn't get to EOB, today he completed bed mobility w VC on technique for log roll and SBA, stood w. CGA to SBA, walked w. feet close together to window and back to bed. Pt plans to dc home to his [facility] house. Pt states his mom has a shower chair he could borrow that she doesn't need right now. Cont OT to incr ADL, strength, balance, and tsf. Recommend dc home w. GF at DC.     Time Calculation:   Time Calculation- OT     Row Name 09/16/21 1313             Time Calculation- OT    OT Start Time  0918  -      OT Stop Time  0932  -      OT Time Calculation (min)  14 min  -      OT Received On  09/16/21  -      OT - Next Appointment  09/17/21  -       OT Goal Re-Cert Due Date  09/23/21  -KP         Untimed Charges    OT Eval/Re-eval Minutes  14  -KP         Total Minutes    Untimed Charges Total Minutes  14  -KP       Total Minutes  14  -KP        User Key  (r) = Recorded By, (t) = Taken By, (c) = Cosigned By    Initials Name Provider Type    Annie Clifford OTR Occupational Therapist        Therapy Charges for Today     Code Description Service Date Service Provider Modifiers Qty    42608339719 HC OT EVAL LOW COMPLEXITY 2 9/16/2021 Annie Cardenas OTR GO 1               SELVIN Arboleda  9/16/2021

## 2021-09-16 NOTE — PLAN OF CARE
Goal Outcome Evaluation:  Plan of Care Reviewed With: patient           Outcome Summary: pt adm with low back pain, acute onset, SAMIRA evaluation pending. Pt was able to walk 120 ft with CGA  with slow, antalgic gait, narrow JOJO. Pt educated on log roll and body mechanics, positioning to minimize pressure on low back, and importance of walking. Pt will benefit from 1-2 more PT visits but also encouraged to walk again later with nursing staff, discharge rec is home with assist  Patient was intermittently wearing a face mask during this therapy encounter. Therapist used appropriate personal protective equipment including eye protection, mask, and gloves.  Mask used was standard procedure mask. Appropriate PPE was worn during the entire therapy session. Hand hygiene was completed before and after therapy session. Patient is not in enhanced droplet precautions.

## 2021-09-16 NOTE — THERAPY EVALUATION
Patient Name: Samir Enrique  : 1985    MRN: [id number]                              Today's Date: 2021       Admit Date: 2021    Visit Dx:     ICD-10-CM ICD-9-CM   1. Intractable pain  R52 780.96   2. Acute low back pain, unspecified back pain laterality, unspecified whether sciatica present  M54.5 724.2     Patient Active Problem List   Diagnosis   • Intractable pain   • Intractable low back pain   • Tobacco dependence   • Unable to ambulate     Past Medical History:   Diagnosis Date   • Bronchitis      Past Surgical History:   Procedure Laterality Date   • TYMPANOSTOMY TUBE PLACEMENT       General Information     Row Name 21 1441          Physical Therapy Time and Intention    Document Type  evaluation  -PC     Mode of Treatment  physical therapy  -PC     Row Name 21 1441          General Information    Existing Precautions/Restrictions  fall  -PC     Row Name 21 1441          Cognition    Orientation Status (Cognition)  oriented x 4  -PC     Row Name 21 1441          Safety Issues, Functional Mobility    Impairments Affecting Function (Mobility)  pain  -PC       User Key  (r) = Recorded By, (t) = Taken By, (c) = Cosigned By    Initials Name Provider Type    PC Polly Hammonds PT Physical Therapist        Mobility     Row Name 21 1442          Bed Mobility    Bed Mobility  sidelying-sit;sit-sidelying  -PC     Sidelying-Sit [place] (Bed Mobility)  verbal cues;contact guard  -PC     Assistive Device (Bed Mobility)  bed rails  -PC     Comment (Bed Mobility)  log roll technique reinforced  -PC     Row Name 21 1442          Sit-Stand Transfer    Sit-Stand [place] (Transfers)  contact guard  -PC     Row Name 21 1442          Gait/Stairs (Locomotion)    [place] Level (Gait)  contact guard  -PC     Distance in Feet (Gait)  120 ft  -PC     Deviations/Abnormal Patterns (Gait)  antalgic;gait speed decreased;stride length decreased;base of support,  narrow  -PC       User Key  (r) = Recorded By, (t) = Taken By, (c) = Cosigned By    Initials Name Provider Type    PC Polly Hammonds PT Physical Therapist        Obj/Interventions     Row Name 09/16/21 1443          Range of Motion Comprehensive    General Range of Motion  no range of motion deficits identified  -PC     Row Name 09/16/21 1443          Strength Comprehensive (MMT)    Comment, General Manual Muscle Testing (MMT) Assessment  B LE WNL, pain limiting  -PC     [facility] Name 09/16/21 1443          Balance    Balance Assessment  standing static balance;standing dynamic balance  -PC     Static Sitting Balance  WNL  -PC     Static Standing Balance  WFL  -PC     Dynamic Standing Balance  WFL  -PC       User Key  (r) = Recorded By, (t) = Taken By, (c) = Cosigned By    Initials Name Provider Type    PC Polly Hammonds, PT Physical Therapist        Goals/Plan     Row Name 09/16/21 1448          Bed Mobility Goal 1 (PT)    Activity/Assistive Device (Bed Mobility Goal 1, PT)  sidelying to sit/sit to sidelying  -PC     [place] Level/Cues Needed (Bed Mobility Goal 1, PT)  modified independence  -PC     Time Frame (Bed Mobility Goal 1, PT)  1 week  -PC     Row Name 09/16/21 1448          Transfer Goal 1 (PT)    Activity/Assistive Device (Transfer Goal 1, PT)  sit-to-stand/stand-to-sit  -PC     [place] Level/Cues Needed (Transfer Goal 1, PT)  modified independence  -PC     Time Frame (Transfer Goal 1, PT)  1 week  -PC     Row Name 09/16/21 1448          Gait Training Goal 1 (PT)    Activity/Assistive Device (Gait Training Goal 1, PT)  gait (walking locomotion)  -PC     [place] Level (Gait Training Goal 1, PT)  independent  -PC     Distance (Gait Training Goal 1, PT)  200 ft  -PC     Time Frame (Gait Training Goal 1, PT)  1 week  -PC       User Key  (r) = Recorded By, (t) = Taken By, (c) = Cosigned By    Initials Name Provider Type    PC Polly Hammonds, PT Physical Therapist        Clinical Impression      Row Name 09/16/21 1444          Pain Scale: Numbers Pre/Post-Treatment    Pretreatment Pain Rating  3/10  -PC     Posttreatment Pain Rating  7/10  -PC     Pain Location - Orientation  lower  -PC     Pain Location  back  -PC     Pain Intervention(s)  Repositioned;Medication (See MAR)  -PC     Row Name 09/16/21 1444          Plan of Care Review    Plan of Care Reviewed With  patient  -PC     Outcome Summary  pt adm with low back pain, acute onset, SAMIRA evaluation pending. Pt was able to walk 120 ft with CGA  with slow, antalgic gait, narrow JOJO. Pt educated on log roll and body mechanics, positioning to minimize pressure on low back, and importance of walking. Pt will benefit from 1-2 more PT visits but also encouraged to walk again later with nursing staff, discharge rec is home with assist  -PC     Row Name 09/16/21 1444          Therapy Assessment/Plan (PT)    Rehab Potential (PT)  good, to achieve stated therapy goals  -PC     Criteria for Skilled Interventions Met (PT)  yes;meets criteria  -PC     Row Name 09/16/21 1444          Positioning and Restraints    Pre-Treatment Position  in bed  -PC     Post Treatment Position  bed  -PC     In Bed  supine;call light within reach;encouraged to call for assist;exit alarm on  -PC       User Key  (r) = Recorded By, (t) = Taken By, (c) = Cosigned By    Initials Name Provider Type    PC Polly Hammonds, PT Physical Therapist        Outcome Measures     Row Name 09/16/21 1448          How much help from another person do you currently need...    Turning from your back to your side while in flat bed without using bedrails?  3  -PC     Moving from lying on back to sitting on the side of a flat bed without bedrails?  3  -PC     Moving to and from a bed to a chair (including a wheelchair)?  3  -PC     Standing up from a chair using your arms (e.g., wheelchair, bedside chair)?  3  -PC     Climbing 3-5 steps with a railing?  3  -PC     To walk in hospital room?  3  -PC     AM-PAC 6  Clicks Score (PT)  18  -PC     Row Name 09/16/21 1448 09/16/21 1311       Functional Assessment    Outcome Measure Options  AM-PAC 6 Clicks Basic Mobility (PT)  -  AM-[facility] 6 Clicks Daily Activity (OT)  -      User Key  (r) = Recorded By, (t) = Taken By, (c) = Cosigned By    Initials Name Provider Type    Annie Clifford, OTR Occupational Therapist    Polly Rey, PT Physical Therapist                       Physical Therapy Education                 Title: PT OT SLP Therapies (In Progress)     Topic: Physical Therapy (In Progress)     Point: Mobility training (Done)     Learning Progress Summary           Patient Acceptance, E,D, DU by  at 9/16/2021 1449                   Point: Home exercise program (Not Started)     Learner Progress:  Not documented in this visit.          Point: Body mechanics (Done)     Learning Progress Summary           Patient Acceptance, E,D, DU by  at 9/16/2021 1449                   Point: Precautions (Done)     Learning Progress Summary           Patient Acceptance, E,D, DU by  at 9/16/2021 1449                               User Key     Initials Effective Dates Name Provider Type Discipline     06/16/21 -  Polly Hammonds, PT Physical Therapist PT              PT Recommendation and Plan  Planned Therapy Interventions (PT): bed mobility training, gait training, transfer training, strengthening  Plan of Care Reviewed With: patient  Outcome Summary: pt adm with low back pain, acute onset, SAMIRA evaluation pending. Pt was able to walk 120 ft with CGA  with slow, antalgic gait, narrow JOJO. Pt educated on log roll and body mechanics, positioning to minimize pressure on low back, and importance of walking. Pt will benefit from 1-2 more PT visits but also encouraged to walk again later with nursing staff, discharge rec is home with assist     Time Calculation:   PT Charges     Row Name 09/16/21 1457             Time Calculation    Start Time  1419  -PC      Stop Time   1444  -PC      Time Calculation (min)  25 min  -PC      PT Received On  09/16/21  -PC      PT - Next Appointment  09/17/21  -PC      PT Goal Re-Cert Due Date  09/23/21  -PC        User Key  (r) = Recorded By, (t) = Taken By, (c) = Cosigned By    Initials Name Provider Type    PC Polly Hammonds, PT Physical Therapist        Therapy Charges for Today     Code Description Service Date Service Provider Modifiers Qty    05481217195  PT EVAL LOW COMPLEXITY 2 9/16/2021 Polly Hammonds, PT GP 1    [id number]  PT THER PROC EA 15 MIN 9/16/2021 Polly Hammonds, PT GP 1          PT G-Codes  Outcome Measure Options: AM-PAC 6 Clicks Basic Mobility (PT)  AM-PAC 6 Clicks Score (PT): 18  AM-PAC 6 Clicks Score (OT): 18    Polly Hammonds PT  9/16/2021

## 2021-09-16 NOTE — PLAN OF CARE
Goal Outcome Evaluation:              Outcome Summary: admitted w/intractable back pain, A&O, VSS, RA, neuro consulted-MRIs ordered, steriods given, pain is improving, PT ambulated pt in hallway, [facility]

## 2021-09-17 ENCOUNTER — APPOINTMENT (OUTPATIENT)
Dept: MRI IMAGING | Facility: HOSPITAL | Age: 36
End: 2021-09-17

## 2021-09-17 PROCEDURE — 72141 MRI NECK SPINE W/O DYE: CPT

## 2021-09-17 PROCEDURE — 97110 THERAPEUTIC EXERCISES: CPT | Performed by: PHYSICAL THERAPIST

## 2021-09-17 PROCEDURE — 72146 MRI CHEST SPINE W/O DYE: CPT

## 2021-09-17 PROCEDURE — 63710000001 METHYLPREDNISOLONE 4 MG TABLET THERAPY PACK 21 EACH DISP PACK: Performed by: INTERNAL MEDICINE

## 2021-09-17 RX ORDER — POLYETHYLENE GLYCOL 3350 17 G/17G
17 POWDER, FOR SOLUTION ORAL DAILY
Status: DISCONTINUED | OUTPATIENT
Start: 2021-09-17 | End: 2021-09-18 | Stop reason: HOSPADM

## 2021-09-17 RX ADMIN — METHYLPREDNISOLONE 4 MG: 4 TABLET ORAL at 12:42

## 2021-09-17 RX ADMIN — METHYLPREDNISOLONE 4 MG: 4 TABLET ORAL at 08:57

## 2021-09-17 RX ADMIN — METHOCARBAMOL TABLETS 750 MG: 750 TABLET, COATED ORAL at 20:27

## 2021-09-17 RX ADMIN — METHOCARBAMOL TABLETS 750 MG: 750 TABLET, COATED ORAL at 16:23

## 2021-09-17 RX ADMIN — VENLAFAXINE HYDROCHLORIDE 150 MG: 150 CAPSULE, EXTENDED RELEASE ORAL at 08:57

## 2021-09-17 RX ADMIN — METHYLPREDNISOLONE 4 MG: 4 TABLET ORAL at 23:13

## 2021-09-17 RX ADMIN — METHYLPREDNISOLONE 4 MG: 4 TABLET ORAL at 19:15

## 2021-09-17 RX ADMIN — OXYCODONE AND ACETAMINOPHEN 1 TABLET: 7.5; 325 TABLET ORAL at 19:15

## 2021-09-17 RX ADMIN — METHOCARBAMOL TABLETS 750 MG: 750 TABLET, COATED ORAL at 08:57

## 2021-09-17 RX ADMIN — POLYETHYLENE GLYCOL 3350 17 G: 17 POWDER, FOR SOLUTION ORAL at 12:42

## 2021-09-17 NOTE — PROGRESS NOTES
Name: Samir Enrique ADMIT: 2021   : 1985  PCP: Rosalba Verde MD    MRN: [id number] LOS: 1 days   AGE/SEX: 36 y.o. male  ROOM: [facility]   Subjective   Chief Complaint   Patient presents with   • Back Pain     Pain still moderate  Partially improved with meds  Working with PT  Had MRI L spine  Awaiting MRI T and C spine    ROS  No f/c  No n/v  No cp/palp  No soa/cough    Objective   Vital Signs  Temp:  [97.8 °F (36.6 °C)-98.9 °F (37.2 °C)] 97.8 °F (36.6 °C)  Heart Rate:  [66-80] 71  Resp:  [16-18] 16  BP: (103-125)/(64-78) 119/68  SpO2:  [97 %-100 %] 99 %  on   ;   Device (Oxygen Therapy): room air  Body mass index is 22.83 kg/m².    Physical Exam  Constitutional:       General: He is in acute distress (in some pain).      Appearance: He is well-developed.   HENT:      Head: Normocephalic and atraumatic.   Eyes:      General: No scleral icterus.  Cardiovascular:      Rate and Rhythm: Normal rate and regular rhythm.      Heart sounds: Normal heart sounds.   Pulmonary:      Effort: Pulmonary effort is normal. No respiratory distress.      Breath sounds: Normal breath sounds.   Abdominal:      General: There is no distension.      Palpations: Abdomen is soft.      Tenderness: There is no abdominal tenderness.   Musculoskeletal:      Cervical back: Neck supple.   Neurological:      Mental Status: He is alert.   Psychiatric:         Behavior: Behavior normal.         Results Review:       I reviewed the patient's new clinical results.  Results from last 7 days   Lab Units 21  0643 09/15/21  0325   WBC 10*3/mm3 9.89 11.38*   HEMOGLOBIN g/dL 13.2 13.5   PLATELETS 10*3/mm3 212 203     Results from last 7 days   Lab Units 09/15/21  0325   SODIUM mmol/L 142   POTASSIUM mmol/L 3.9   CHLORIDE mmol/L 106   CO2 mmol/L 25.8   BUN mg/dL 9   CREATININE mg/dL 0.64*   GLUCOSE mg/dL 91   Estimated Creatinine Clearance: 175.5 mL/min (A) (by C-G formula based on SCr of 0.64 mg/dL (L)).    Results from last 7 days   Lab  Units 09/15/21  0325   CALCIUM mg/dL 8.7         Coag     HbA1C No results found for: HGBA1C  Infection     Radiology(recent) No radiology results for the last day  No results found for: TROPONINT, TROPONINI, BNP  No components found for: TSH;2    methocarbamol, 750 mg, Oral, TID  methylPREDNISolone, 4 mg, Oral, 4x Daily Taper  [START ON 9/18/2021] methylPREDNISolone, 4 mg, Oral, TID Around Food  [START ON 9/19/2021] methylPREDNISolone, 4 mg, Oral, Before Breakfast  [START ON 9/19/2021] methylPREDNISolone, 4 mg, Oral, Tonight  [START ON 9/20/2021] methylPREDNISolone, 4 mg, Oral, Before Breakfast  polyethylene glycol, 17 g, Oral, Daily  venlafaxine XR, 150 mg, Oral, Daily With Breakfast       Diet Regular      Assessment/Plan      Active Hospital Problems    Diagnosis  POA   • **Intractable low back pain [M54.5]  Yes   • Tobacco dependence [F17.200]  Yes   • Unable to ambulate [R26.2]  Yes   • Intractable pain [R52]  Yes      Resolved Hospital Problems   No resolved problems to display.       · Continue measures for pain control  · Steroids and NSAIDs added  · MRI L spine without clear source of symptoms- have consulted SAMIRA who have ordered MRI T and C spine  · Above medications    Monitor closely, on high risk medications including pain medications and steroids    Greater than 36 minutes spent with greater than 50% counseling and coordinating care      DW staff    Dispo- hopefully to home tomorrow pending the MRIs      Chico Reddy MD  [facility]  09/17/21  16:15 EDT

## 2021-09-17 NOTE — PROGRESS NOTES
NEUROSURGERY NOTE      MRI CERVICAL AND THORACIC SPINE not yet completed. Contacted MRI department. MRI technician could not give any indication of when they might be able to complete ordered studies.       We will check back once MRI's are complete.

## 2021-09-17 NOTE — PLAN OF CARE
Goal Outcome Evaluation:              Outcome Summary: admitted for back pain, A&O, VSS, RA, assist x1, currently off floor for MRIs, CTM

## 2021-09-17 NOTE — THERAPY TREATMENT NOTE
Patient Name: Samir Enrique  : 1985    MRN: [id number]                              Today's Date: 2021       Admit Date: 2021    Visit Dx:     ICD-10-CM ICD-9-CM   1. Intractable pain  R52 780.96   2. Acute low back pain, unspecified back pain laterality, unspecified whether sciatica present  M54.5 724.2     Patient Active Problem List   Diagnosis   • Intractable pain   • Intractable low back pain   • Tobacco dependence   • Unable to ambulate     Past Medical History:   Diagnosis Date   • Bronchitis      Past Surgical History:   Procedure Laterality Date   • TYMPANOSTOMY TUBE PLACEMENT       General Information     Row Name 21 1608          Physical Therapy Time and Intention    Document Type  therapy note (daily note)  -     Mode of Treatment  individual therapy;physical therapy  -       User Key  (r) = Recorded By, (t) = Taken By, (c) = Cosigned By    Initials Name Provider Type    Patricia Bryan, PIPO Physical Therapist        Mobility     Row Name 21 1608          Bed Mobility    All Activities, [place] (Bed Mobility)  set up;standby assist;verbal cues  -[facility] Name 21 1608          Sit-Stand Transfer    Sit-Stand [place] (Transfers)  standby assist  -[facility] Name 21 1608          Gait/Stairs (Locomotion)    [place] Level (Gait)  standby assist  -     Distance in Feet (Gait)  125  -KH     Deviations/Abnormal Patterns (Gait)  antalgic;gait speed decreased;stride length decreased;base of support, narrow  -       User Key  (r) = Recorded By, (t) = Taken By, (c) = Cosigned By    Initials Name Provider Type    Patricia Bryan PT Physical Therapist        Obj/Interventions    No documentation.       Goals/Plan    No documentation.       Clinical Impression     Row Name 21 1609          Pain    Additional Documentation  Pain Scale: Numbers Pre/Post-Treatment (Group)  -[facility] Name 21 1607          Pain Scale:  Numbers Pre/Post-Treatment    Pretreatment Pain Rating  0/10 - no pain  -KH     Posttreatment Pain Rating  4/10  -KH     Pain Location - Side  Bilateral  -KH     Pain Location - Orientation  lower  -KH     Pain Location  back  -     Pain Intervention(s)  Repositioned;Ambulation/increased activity  -     Row Name 09/17/21 1609          Plan of Care Review    Outcome Summary  Pt was agreeable to therapy and ambulating slowly, but without assistance. MRI is still pending. Will follow up tomorrow to practice stairs if no intervention planned.  -       User Key  (r) = Recorded By, (t) = Taken By, (c) = Cosigned By    Initials Name Provider Type    Patricia Bryan, PT Physical Therapist        Outcome Measures     Row Name 09/17/21 1611          How much help from another person do you currently need...    Turning from your back to your side while in flat bed without using bedrails?  4  -KH     Moving from lying on back to sitting on the side of a flat bed without bedrails?  4  -KH     Moving to and from a bed to a chair (including a wheelchair)?  4  -KH     Standing up from a chair using your arms (e.g., wheelchair, bedside chair)?  4  -KH     Climbing 3-5 steps with a railing?  3  -KH     To walk in hospital room?  4  -KH     AM-PAC 6 Clicks Score (PT)  23  -     Row Name 09/17/21 1611          Functional Assessment    Outcome Measure Options  AM-PAC 6 Clicks Basic Mobility (PT)  -       User Key  (r) = Recorded By, (t) = Taken By, (c) = Cosigned By    Initials Name Provider Type    Patricia Bryan PT Physical Therapist                       Physical Therapy Education                 Title: PT OT SLP Therapies (In Progress)     Topic: Physical Therapy (In Progress)     Point: Mobility training (Done)     Learning Progress Summary           Patient Acceptance, E, VU by FERCHO at 9/17/2021 1610    Acceptance, E,D, ISABEL by EDDY at 9/16/2021 1449                   Point: Home exercise program (Not  Started)     Learner Progress:  Not documented in this visit.          Point: Body mechanics (Done)     Learning Progress Summary           Patient Acceptance, E,D, DU by PC at 9/16/2021 1449                   Point: Precautions (Done)     Learning Progress Summary           Patient Acceptance, E, VU by  at 9/17/2021 1610    Acceptance, E,D, DU by PC at 9/16/2021 1449                               User Key     Initials Effective Dates Name Provider Type Discipline     06/16/21 -  Patricia Martinez, PT Physical Therapist PT    PC 06/16/21 -  Polly Hammonds PT Physical Therapist PT              PT Recommendation and Plan     Outcome Summary: Pt was agreeable to therapy and ambulating slowly, but without assistance. MRI is still pending. Will follow up tomorrow to practice stairs if no intervention planned.     Time Calculation:   PT Charges     Row Name 09/17/21 1611             Time Calculation    Start Time  1519  -      Stop Time  1532  -      Time Calculation (min)  13 min  -KH         Time Calculation- PT    Total Timed Code Minutes- PT  13 minute(s)  -KH         Timed Charges    90014 - PT Therapeutic Exercise Minutes  13  -KH         Total Minutes    Timed Charges Total Minutes  13  -KH       Total Minutes  13  -KH        User Key  (r) = Recorded By, (t) = Taken By, (c) = Cosigned By    Initials Name Provider Type     Patricia Martinez, PT Physical Therapist        Therapy Charges for Today     Code Description Service Date Service Provider Modifiers Qty    [id number] HC PT THER PROC EA 15 MIN 9/17/2021 Patricia Martinez, PT GP 1          PT G-Codes  Outcome Measure Options: AM-PAC 6 Clicks Basic Mobility (PT)  AM-PAC 6 Clicks Score (PT): 23  AM-PAC 6 Clicks Score (OT): 18    Patricai Martinez, PT  9/17/2021

## 2021-09-17 NOTE — PLAN OF CARE
Goal Outcome Evaluation:           Progress: no change   Pt admitted on 9/14 with intractable back pain. Pt heard a pop when he bent down. Pt pain has since been better controlled with PO steroids and Robaxin. Pt was able to ambulate in the hallway with PT which is an improvement. Pt is scheduled for another MRI. MRI screening sheet done. Pt is resting at this time, will continue to monitor.

## 2021-09-17 NOTE — PAYOR COMM NOTE
"INITIAL CLINICAL FOR REVIEW  REF #[id number]  F:  [phone]        IvaSamir (36 y.o. Male)     Date of Birth Social Security Number Address Home Phone MRN    1985  [address] [facility] [phone] [id number]    Confucianist Marital Status          None Single       Admission Date Admission Type Admitting Provider Attending Provider Department, Room/Bed    9/14/21 Emergency Cordell Marcelo MD Jackson, Alan David, MD [facility] [address] [place], P890/1    Discharge Date Discharge Disposition Discharge Destination                       Attending Provider: Chico Reddy MD    Allergies: Ceclor [Cefaclor]    Isolation: None   Infection: None   Code Status: CPR    Ht: 185.4 cm (73\")   Wt: 78.5 kg (173 lb 1 oz)    Admission Cmt: None   Principal Problem: Intractable low back pain [M54.5]                 Active Insurance as of 9/14/2021     Primary Coverage     Payor Plan Insurance Group Employer/Plan Group    [employer] [facility] PPO [id number]     Payor Plan Address Payor Plan Phone Number Payor Plan Fax Number Effective Dates    [address] [phone]  1/1/2016 - None Entered    [place] [id number]       Subscriber Name Subscriber Birth Date Member ID       SAMIR BLACKMON 1985 [id number]                 Emergency Contacts      (Rel.) Home Phone Work Phone Mobile Phone    IvaNARA (Father) -- -- [phone]               History & Physical      Karen Rubio APRN at 09/15/21 1002     Attestation signed by Chico Reddy MD at 09/15/21 1412    I supervised care provided by the APRN. We have discussed the case. I have reviewed  the note and agree with the plan of treatment. I personally interviewed the patient and examined the patient. Exam with 37 yo male. No resp distress. Pain in lower back with movement.     Plan as listed in APRN note.     Chico Reddy MD  [facility] [facility]  09/15/21  14:09 EDT                        Patient Name:  Samir Enrique  YOB: 1985  MRN:  [id number]  Admit Date:  9/14/2021  Patient Care Team:  Rosalba Verde MD as PCP - General (Internal Medicine)      Subjective   History Present Illness     Chief Complaint   Patient presents with   • Back Pain       Mr. Enrique is a 36 y.o. male smoker with a benign medical history that presents to [facility] complaining of acute low back pain. Yesterday afternoon he reached down to pull off his sock when he heard a \"pop\" in his back. He had immediate pain to his low back. He has had difficulty moving due to the pain; he reports having to stay on the couch for \"5 hours\" until he could get to the ED. Denies injury/trauma/increased activity. Pain is present and rated 10/10 with any movement. It does radiate into his rt buttock but not down his leg. Denies recent fever, chest pain, shortness of breath, n/v, change in bowel/bladder, numbness/tingling. He was not able to ambulate in ED. He smokes 1 PPD; drinks etoh on occasion, not regularly. Denies illicit drug use.    Afebrile. HR controlled. BP stable. On room air. Covid neg. WBC 11.38, Hgb 13.5. BMP unremarkable. T spine xray: mild mid thoracic scoliosis convexed to rt; no acute findings. L spine xray: no acute findings      Review of Systems   Constitutional: Negative for fever.   HENT: Negative for congestion.    Respiratory: Negative for shortness of breath.    Cardiovascular: Negative for chest pain.   Gastrointestinal: Negative for nausea.   Genitourinary: Negative for difficulty urinating.   Musculoskeletal: Positive for back pain and gait problem.   Skin: Negative for rash.   Neurological: Negative for weakness and numbness.   Psychiatric/Behavioral: Negative for sleep disturbance.        Personal History     Past Medical History:   Diagnosis Date   • Bronchitis      Past Surgical History:   Procedure Laterality Date   • " TYMPANOSTOMY TUBE PLACEMENT       No family history on file.  Social History     Tobacco Use   • Smoking status: Current Every Day Smoker     Packs/day: 2.00   • Smokeless tobacco: Never Used   Substance Use Topics   • Alcohol use: Yes     Alcohol/week: 10.0 standard drinks     Types: 10 Cans of beer per week   • Drug use: No     No current facility-administered medications on file prior to encounter.     Current Outpatient Medications on File Prior to Encounter   Medication Sig Dispense Refill   • venlafaxine XR (EFFEXOR-XR) 150 MG 24 hr capsule Take 150 mg by mouth daily.     • Sulfamethoxazole-Trimethoprim (BACTRIM PO) Take  by mouth 2 (two) times a day.       Allergies   Allergen Reactions   • Ceclor [Cefaclor] Hives       Objective    Objective     Vital Signs  Temp:  [97.8 °F (36.6 °C)-98.1 °F (36.7 °C)] 97.8 °F (36.6 °C)  Heart Rate:  [63-80] 63  Resp:  [16] 16  BP: (122-134)/(75-82) 122/81  SpO2:  [95 %-98 %] 97 %  on   ;   Device (Oxygen Therapy): room air  Body mass index is 22.83 kg/m².    Physical Exam  Vitals and nursing note reviewed.   Constitutional:       General: He is not in acute distress.  HENT:      Head: Normocephalic.      Mouth/Throat:      Mouth: Mucous membranes are moist.   Eyes:      Conjunctiva/sclera: Conjunctivae normal.   Cardiovascular:      Rate and Rhythm: Normal rate and regular rhythm.   Pulmonary:      Effort: Pulmonary effort is normal. No respiratory distress.      Breath sounds: Normal breath sounds.   Abdominal:      General: Bowel sounds are normal.      Palpations: Abdomen is soft.   Musculoskeletal:      Cervical back: Neck supple.      Right lower leg: No edema.      Left lower leg: No edema.   Skin:     General: Skin is warm and dry.   Neurological:      Mental Status: He is alert and oriented to person, place, and time.      Motor: No weakness.   Psychiatric:         Mood and Affect: Mood normal.         Behavior: Behavior normal.         Results Review:  I reviewed  the patient's new clinical results.  I reviewed the patient's new imaging results and agree with the interpretation.  I reviewed the patient's other test results and agree with the interpretation  I personally viewed and interpreted the patient's EKG/Telemetry data    Lab Results (last 24 hours)     Procedure Component Value Units Date/Time    CBC & Differential [[id number]]  (Abnormal) Collected: 09/15/21 0325    Specimen: Blood Updated: 09/15/21 0344    Narrative:      The following orders were created for panel order CBC & Differential.  Procedure                               Abnormality         Status                     ---------                               -----------         ------                     CBC Auto Differential[[id number]]         Abnormal            Final result                 Please view results for these tests on the individual orders.    Basic Metabolic Panel [[id number]]  (Abnormal) Collected: 09/15/21 0325    Specimen: Blood Updated: 09/15/21 0349     Glucose 91 mg/dL      BUN 9 mg/dL      Creatinine 0.64 mg/dL      Sodium 142 mmol/L      Potassium 3.9 mmol/L      Chloride 106 mmol/L      CO2 25.8 mmol/L      Calcium 8.7 mg/dL      eGFR Non African Amer 141 mL/min/1.73      BUN/Creatinine Ratio 14.1     Anion Gap 10.2 mmol/L     Narrative:      GFR Normal >60  Chronic Kidney Disease <60  Kidney Failure <15      COVID PRE-OP / PRE-PROCEDURE SCREENING ORDER (NO ISOLATION) - Swab, Nasopharynx [[id number]]  (Normal) Collected: 09/15/21 0325    Specimen: Swab from Nasopharynx Updated: 09/15/21 0424    Narrative:      The following orders were created for panel order COVID PRE-OP / PRE-PROCEDURE SCREENING ORDER (NO ISOLATION) - Swab, Nasopharynx.  Procedure                               Abnormality         Status                     ---------                               -----------         ------                     COVID-19, [facility] IN-HOUSE ...[[id number]]  Normal              Final result                  Please view results for these tests on the individual orders.    CBC Auto Differential [[id number]]  (Abnormal) Collected: 09/15/21 0325    Specimen: Blood Updated: 09/15/21 0344     WBC 11.38 10*3/mm3      RBC 4.42 10*6/mm3      Hemoglobin 13.5 g/dL      Hematocrit 42.4 %      MCV 95.9 fL      MCH 30.5 pg      MCHC 31.8 g/dL      RDW 12.2 %      RDW-SD 43.4 fl      MPV 9.7 fL      Platelets 203 10*3/mm3      Neutrophil % 64.2 %      Lymphocyte % 25.2 %      Monocyte % 7.3 %      Eosinophil % 2.5 %      Basophil % 0.4 %      Immature Grans % 0.4 %      Neutrophils, Absolute 7.31 10*3/mm3      Lymphocytes, Absolute 2.87 10*3/mm3      Monocytes, Absolute 0.83 10*3/mm3      Eosinophils, Absolute 0.29 10*3/mm3      Basophils, Absolute 0.04 10*3/mm3      Immature Grans, Absolute 0.04 10*3/mm3      nRBC 0.0 /100 WBC     COVID-19,[facility] IN-HOUSE CEPHEID/JOAN NP SWAB IN TRANSPORT MEDIA 8-12 HR TAT - Swab, Nasopharynx [[id number]]  (Normal) Collected: 09/15/21 0325    Specimen: Swab from Nasopharynx Updated: 09/15/21 0424     COVID19 Not Detected    Narrative:      Fact sheet for providers: https://www.fda.gov/media/334477/download     Fact sheet for patients: https://www.fda.gov/media/876744/download          Imaging Results (Last 24 Hours)     Procedure Component Value Units Date/Time    XR Spine Lumbar Complete 4+VW [[id number]] Collected: 09/15/21 0258     Updated: 09/15/21 0258    Narrative:        Patient: MARIS BLACKMON  Time Out: 02:57  Exam(s): FILM L SPINE 4+ VIEWS     EXAM:    XR Lumbosacral Spine, 4 or 5 Views    CLINICAL HISTORY:     Reason for exam: Lower back pain.    TECHNIQUE:    Frontal, lateral and oblique views of the lumbar spine.    COMPARISON:    No relevant prior studies available.    FINDINGS:    Vertebrae:  Unremarkable.  No fracture.  Normal alignment.    Sacrum coccyx:  Unremarkable as visualized.  No acute fracture.    Disc spaces:  No acute findings.  No significant narrowing.    Soft tissues:   Unremarkable.    IMPRESSION: No acute findings    Impression:          Electronically signed by Khurram Duran M.D. on 09-15-21 at 0257    XR Spine Thoracic 3 View [[id number]] Collected: 09/15/21 0256     Updated: 09/15/21 0256    Narrative:        Patient: MARIS ENRIQUE  Time Out: 02:56  Exam(s): FILM T SPINE     EXAM:    XR Thoracic Spine, 2 Views    CLINICAL HISTORY:     Reason for exam: Mid back pain.    TECHNIQUE:    Frontal and lateral views of the thoracic spine.    COMPARISON:    No relevant prior studies available.    FINDINGS:    Vertebrae:  Mild mid thoracic scoliosis convexed to the right.  No   fracture.  Normal alignment.    Disc spaces:  No acute findings.  No significant narrowing.    Soft tissues:  Unremarkable.    IMPRESSION:         No acute findings in the thoracic spine.      Impression:          Electronically signed by Khurram Duran M.D. on 09-15-21 at 0256              No orders to display        Assessment/Plan     Active Hospital Problems    Diagnosis  POA   • **Intractable low back pain [M54.5]  Yes   • Tobacco dependence [F17.200]  Yes   • Unable to ambulate [R26.2]  Yes      Resolved Hospital Problems   No resolved problems to display.       Mr. Enrique is a 36 y.o. male smoker with a benign medical history who is admitted for acute onset intractable low back pain    -MRI L spine. Analgesics as needed. Add toradol. PT eval. Xrays unremarkable  -Encourage smoking cessation. Offered nicotine patch but refusing at this time, no cravings  -Mild elevated WBC likely reactive. No signs/symptoms of infection. Will trend    · I discussed the patient's findings and my recommendations with patient and Dr. Reddy.    VTE Prophylaxis - SCDs.  Code Status - Full code.       RAYSHAWN Alvarado  [facility]  09/15/21  10:13 EDT      Electronically signed by Chico Reddy MD at 09/15/21 1412          Emergency Department Notes      Ruy Ghotra, YULISSA at 09/14/21 2029         Patient presents to ED from home, notes he was in shower bent over and felt a pop to his middle back.  Patient notes pain to lumbar region, was a 10 out of 10, given fentanyl en route and 700 cc of fluid.  Patient is alert and oriented x4.  Denies any irregularities in bowel or bladder patterns.  Patient did have vagal response when transferring to stretcher, currently VSS.  NSR.  ABC's intact.  NAD noted. Patient wearing mask, nurse wearing mask, n95 and protective eyewear during care and assessment.  Hand hygiene performed prior to and post care.      Ruy Ghotra RN  09/14/21 2032      Electronically signed by Ruy Ghotra RN at 09/14/21 2032     Gilmer Greer III, PA at 09/15/21 0010     Attestation signed by Elvin Vale MD at 09/15/21 0629    MD ATTESTATION NOTE    The MERVAT and I have discussed this patient's history, physical exam, and treatment plan.  I have reviewed the documentation and personally had a face to face interaction with the patient. I affirm the documentation and agree with the treatment and plan.  The attached note describes my personal findings.    I agree with the PA's or NP's findings and plan.  I reviewed the PA's or NP's note.  Elvin Vale MD                   EMERGENCY DEPARTMENT ENCOUNTER    Room Number:  01/01  Date of encounter:  9/15/2021  PCP: Rosalba Verde MD  Historian: Patient      HPI:  Chief Complaint: Lower back pain  A complete HPI/ROS/PMH/PSH/SH/FH are unobtainable due to: Nothing    Context: Samir Enrique is a 36 y.o. male who presents to the ED c/o lower back pain that began just PTA.  Patient states he was bending over to pull sock off and felt a pop in his lower back.  Since that time he states he has been unable to move.  May still the pain is said to be tolerable with the slightest movement the pain is 10 out of 10, sharp, localized to the lower back.  The patient denies lower extremity weakness, numbness, tingling,  saddle anesthesia, sciatica, urinary retention, bowel incontinence.  He denies fever, chills, IVD use, remote history of cancer.      PAST MEDICAL HISTORY  Active Ambulatory Problems     Diagnosis Date Noted   • No Active Ambulatory Problems     Resolved Ambulatory Problems     Diagnosis Date Noted   • No Resolved Ambulatory Problems     Past Medical History:   Diagnosis Date   • Bronchitis          PAST SURGICAL HISTORY  Past Surgical History:   Procedure Laterality Date   • TYMPANOSTOMY TUBE PLACEMENT           FAMILY HISTORY  No family history on file.      SOCIAL HISTORY  Social History     Socioeconomic History   • Marital status: Single     Spouse name: Not on file   • Number of children: Not on file   • Years of education: Not on file   • Highest education level: Not on file   Tobacco Use   • Smoking status: Current Every Day Smoker     Packs/day: 2.00   • Smokeless tobacco: Never Used   Substance and Sexual Activity   • Alcohol use: Yes     Alcohol/week: 10.0 standard drinks     Types: 10 Cans of beer per week   • Drug use: No         ALLERGIES  Ceclor [cefaclor]        REVIEW OF SYSTEMS  Review of Systems   Constitutional: Negative for chills and fever.   HENT: Negative.    Respiratory: Negative.    Cardiovascular: Negative.    Gastrointestinal: Negative.    Genitourinary: Negative.    Musculoskeletal: Positive for back pain. Negative for neck pain and neck stiffness.   Skin: Negative.    Neurological: Negative for dizziness and headaches.        All systems reviewed and negative except for those discussed in HPI.       PHYSICAL EXAM    I have reviewed the triage vital signs and nursing notes.    ED Triage Vitals   Temp Heart Rate Resp BP SpO2   09/14/21 2036 09/14/21 2034 09/14/21 2034 09/14/21 2034 09/14/21 2034   98.1 °F (36.7 °C) 80 16 133/75 98 %      Temp src Heart Rate Source Patient Position BP Location FiO2 (%)   -- -- -- -- --              Physical Exam  GENERAL: WDWN male appears uncomfortable  but nontoxic  HENT: nares patent, face symmetric, mucous remains moist  EYES: no scleral icterus  CV: regular rhythm, regular rate, no rubs or gallop  RESPIRATORY: normal effort, lungs CTA B  ABDOMEN: soft, nontender  MUSCULOSKELETAL: TTP vicinity L3-S1, no step-off deformity.  Dorsiflexion plantarflexion of bilateral lower extremities 5 of 5 NEURO: alert and orient x4, moves all extremities, follows commands, gross sensation intact globally  SKIN: warm, dry        LAB RESULTS  No results found for this or any previous visit (from the past 24 hour(s)).    Ordered the above labs and independently reviewed the results.        RADIOLOGY  XR Spine Thoracic 3 View    Result Date: 9/15/2021  Patient: MARIS BLACKMON  Time Out: 02:56 Exam(s): FILM T SPINE EXAM:   XR Thoracic Spine, 2 Views CLINICAL HISTORY:    Reason for exam: Mid back pain. TECHNIQUE:   Frontal and lateral views of the thoracic spine. COMPARISON:   No relevant prior studies available. FINDINGS:   Vertebrae:  Mild mid thoracic scoliosis convexed to the right.  No fracture.  Normal alignment.   Disc spaces:  No acute findings.  No significant narrowing.   Soft tissues:  Unremarkable. IMPRESSION:       No acute findings in the thoracic spine.     Electronically signed by Khurram Duran M.D. on 09-15-21 at 0256    XR Spine Lumbar Complete 4+VW    Result Date: 9/15/2021  Patient: MARIS BLACKMON  Time Out: 02:57 Exam(s): FILM L SPINE 4+ VIEWS EXAM:   XR Lumbosacral Spine, 4 or 5 Views CLINICAL HISTORY:    Reason for exam: Lower back pain. TECHNIQUE:   Frontal, lateral and oblique views of the lumbar spine. COMPARISON:   No relevant prior studies available. FINDINGS:   Vertebrae:  Unremarkable.  No fracture.  Normal alignment.   Sacrum coccyx:  Unremarkable as visualized.  No acute fracture.   Disc spaces:  No acute findings.  No significant narrowing.   Soft tissues:  Unremarkable. IMPRESSION: No acute findings    Electronically signed by Khurram Duran M.D. on  09-15-21 at 0257      I ordered the above noted radiological studies. Reviewed by me and discussed with radiologist.  See dictation for official radiology interpretation.      PROCEDURES    Procedures      MEDICATIONS GIVEN IN ER    Medications   sodium chloride 0.9 % flush 10 mL (has no administration in time range)   acetaminophen (TYLENOL) tablet 650 mg (has no administration in time range)   ondansetron (ZOFRAN) tablet 4 mg (has no administration in time range)     Or   ondansetron (ZOFRAN) injection 4 mg (has no administration in time range)   aluminum-magnesium hydroxide-simethicone (MAALOX MAX) 400-400-40 MG/5ML suspension 15 mL (has no administration in time range)   dexamethasone (DECADRON) injection 10 mg (has no administration in time range)   venlafaxine XR (EFFEXOR-XR) 24 hr capsule 150 mg (has no administration in time range)   HYDROmorphone (DILAUDID) injection 1 mg (1 mg Intravenous Given 9/15/21 0029)   ondansetron (ZOFRAN) injection 4 mg (4 mg Intravenous Given 9/15/21 0029)   HYDROmorphone (DILAUDID) injection 1 mg (1 mg Intravenous Given 9/15/21 0143)   methocarbamol (ROBAXIN) tablet 750 mg (750 mg Oral Given 9/15/21 0141)         PROGRESS, DATA ANALYSIS, CONSULTS, AND MEDICAL DECISION MAKING    All labs have been independently reviewed by me.  All radiology studies have been reviewed by me and discussed with radiologist dictating the report.   EKG's independently viewed and interpreted by me.  Discussion below represents my analysis of pertinent findings related to patient's condition, differential diagnosis, treatment plan and final disposition.    DDx includes but is not limited to: Lumbosacral strain, herniated disc, spondylolisthesis, avulsion injury.  Will order an x-ray for further evaluation.  Please see below for course of care and timeline of events    ED Course as of Sep 15 0339   Wed Sep 15, 2021   0057 Patient unable to move or ambulate at this time.  Will order a second dose of IV  Dilaudid as well as 750 mg of Robaxin.    [RC]   0230 Patient has had a second dose of Dilaudid, Robaxin and we have attempted to ambulate.  Patient is unable to get out of the bed at this time.  There is some concern  for herniated disc.  Given that the patient is unable to ambulate out of the emergency department within the emergency department I do not feel I can send him home with conservative measures.  We will place a call to medicine to discuss admission with the patient to be further evaluated and treated.    [RC]   0322 Patient states he missed his evening Effexor dose and is requesting it at this time.    [RC]      ED Course User Index  [RC] Gilmer Greer III, PA     Patient was placed in face mask in first look. Patient was wearing facemask when I entered the room and throughout our encounter. I wore full protective equipment throughout this patient encounter including a face mask, and gloves. Hand hygiene was performed before donning protective equipment and after removal when leaving the room.    AS OF 03:39 EDT VITALS:    BP - 133/75  HR - 80  TEMP - 98.1 °F (36.7 °C)  O2 SATS - 98%        DIAGNOSIS  Final diagnoses:   Intractable pain   Acute low back pain, unspecified back pain laterality, unspecified whether sciatica present         DISPOSITION  ADMISSION    Discussed treatment plan and reason for admission with pt/family and admitting physician.  Pt/family voiced understanding of the plan for admission for further testing/treatment as needed.              Gilmer Greer III, PA  09/15/21 0339      Electronically signed by Elvin Vale MD at 09/15/21 0629     Sonia Thomas at 09/15/21 0049        Pt was not able to ambulate at this time due to back pain.      Sonia Thomas  09/15/21 0050      Electronically signed by Sonia Thomas at 09/15/21 0050     Arti Perez, RN at 09/15/21 0330        Patient was wearing facemask when I entered  the room and throughout our encounter. I wore full protective equipment throughout this patient encounter including a N95, eye protection, and gloves. Hand hygiene was performed before donning protective equipment and after removal when leaving the room.       Arti Perez RN  09/15/21 0330      Electronically signed by Arti Perez RN at 09/15/21 0330     Elvin Vale MD at 09/15/21 0331        The MERVAT and I have discussed this patients history, physical exam, and treatment plan. I have reviewed the documentation and personally had a face to face interaction with the patient. I affirm the documentation and agree with the treatment and plan.  The following note describes my personal findings    This patient is a 36-year-old male presenting to the emergency room complaining today of low back pain. The patient states that he tried to pull a sock off and felt significant pain in his lower back. He states that the pain is made worse by movement. He denies radiation of the pain into the legs, extremity weakness, groin numbness, urinary retention, or fecal incontinence.    Exam: This patient appears markedly uncomfortable but is currently without gross neurological deficit.  He is afebrile with stable vital signs and nontoxic in appearance.  Abdomen is soft and nontender, without rebound or guarding.  There is no tenderness to the thoracic or lumbar spines and he is without step-off or deformity noted.    Plan: We will make every attempt to get his pain under control with IV analgesia. Will obtain x-rays of the thoracic and lumbar spines. We will monitor and reassess following.      The patient was wearing a facemask upon entrance into the room and remained in such throughout their visit.  I was wearing PPE including a facemask, eye protection, as well as gloves at any point entering the room and throughout the visit     Elvin Vale MD  09/15/21 9656      Electronically signed by Elvin Vale  MD Rohith at 09/15/21 0629     Gabriela Mustafa, RN at 09/15/21 0359          Nursing report ED to floor  Samir Enrique  36 y.o.  male    HPI (triage note):   Chief Complaint   Patient presents with   • Back Pain       Admitting doctor:   Cordell Marcelo MD    Admitting diagnosis:   The primary encounter diagnosis was Intractable pain. A diagnosis of Acute low back pain, unspecified back pain laterality, unspecified whether sciatica present was also pertinent to this visit.    Code status:   Current Code Status     Date Active Code Status Order ID Comments User Context       9/15/2021 0321 CPR [id number]  Scout Pete, APRN ED     Advance Care Planning Activity      Questions for Current Code Status     Question Answer Comment    Code Status CPR     Medical Interventions (Level of Support Prior to Arrest) Full           Allergies:   Ceclor [cefaclor]    Weight:       09/15/21  0356   Weight: 75.8 kg (167 lb)       Most recent vitals:   Vitals:    09/14/21 2036 09/15/21 0341 09/15/21 0355 09/15/21 0356   BP:  134/82     Pulse:   72    Resp:       Temp: 98.1 °F (36.7 °C)      SpO2:   95%    Weight:    75.8 kg (167 lb)   Height:           Active LDAs/IV Access:   Lines, Drains & Airways    Active LDAs     Name:   Placement date:   Placement time:   Site:   Days:    Peripheral IV 09/14/21 2033 Anterior;Distal;Left;Upper Arm   09/14/21 2033    Arm   less than 1                Labs (abnormal labs have a star):   Labs Reviewed   BASIC METABOLIC PANEL - Abnormal; Notable for the following components:       Result Value    Creatinine 0.64 (*)     All other components within normal limits    Narrative:     GFR Normal >60  Chronic Kidney Disease <60  Kidney Failure <15     CBC WITH AUTO DIFFERENTIAL - Abnormal; Notable for the following components:    WBC 11.38 (*)     RDW 12.2 (*)     Neutrophils, Absolute 7.31 (*)     All other components within normal limits   COVID PRE-OP / PRE-PROCEDURE SCREENING ORDER (NO  ISOLATION)    Narrative:     The following orders were created for panel order COVID PRE-OP / PRE-PROCEDURE SCREENING ORDER (NO ISOLATION) - Swab, Nasopharynx.  Procedure                               Abnormality         Status                     ---------                               -----------         ------                     COVID-19, [facility]E IN-HOUSE ...[[id number]]                      In process                   Please view results for these tests on the individual orders.   COVID-19, [facility]LENE IN-HOUSE CEPHEID/JOAN, NP SWAB IN TRANSPORT MEDIA 8-12 HR TAT   CBC AND DIFFERENTIAL    Narrative:     The following orders were created for panel order CBC & Differential.  Procedure                               Abnormality         Status                     ---------                               -----------         ------                     CBC Auto Differential[[id number]]         Abnormal            Final result                 Please view results for these tests on the individual orders.       EKG:   No orders to display       Meds given in ED:   Medications   sodium chloride 0.9 % flush 10 mL (has no administration in time range)   acetaminophen (TYLENOL) tablet 650 mg (has no administration in time range)   ondansetron (ZOFRAN) tablet 4 mg (has no administration in time range)     Or   ondansetron (ZOFRAN) injection 4 mg (has no administration in time range)   aluminum-magnesium hydroxide-simethicone (MAALOX MAX) 400-400-40 MG/5ML suspension 15 mL (has no administration in time range)   dexamethasone (DECADRON) injection 10 mg (has no administration in time range)   venlafaxine XR (EFFEXOR-XR) 24 hr capsule 150 mg (has no administration in time range)   HYDROmorphone (DILAUDID) injection 1 mg (1 mg Intravenous Given 9/15/21 0029)   ondansetron (ZOFRAN) injection 4 mg (4 mg Intravenous Given 9/15/21 0029)   HYDROmorphone (DILAUDID) injection 1 mg (1 mg Intravenous Given 9/15/21 0143)   methocarbamol (ROBAXIN)  tablet 750 mg (750 mg Oral Given 9/15/21 0141)       Imaging results:  XR Spine Thoracic 3 View    Result Date: 9/15/2021  Electronically signed by Khurram Duran M.D. on 09-15-21 at 0256    XR Spine Lumbar Complete 4+VW    Result Date: 9/15/2021  Electronically signed by Khurram Duran M.D. on 09-15-21 at 0257      Ambulatory status:   Ad travis    Social issues:   Social History     Socioeconomic History   • Marital status: Single     Spouse name: Not on file   • Number of children: Not on file   • Years of education: Not on file   • Highest education level: Not on file   Tobacco Use   • Smoking status: Current Every Day Smoker     Packs/day: 2.00   • Smokeless tobacco: Never Used   Substance and Sexual Activity   • Alcohol use: Yes     Alcohol/week: 10.0 standard drinks     Types: 10 Cans of beer per week   • Drug use: No    Nursing report ED to floor     Gabriela Mustafa RN  09/15/21 0359      Electronically signed by Gabriela Mustafa RN at 09/15/21 0359       {Outbreak/Travel/Exposure Documentation......;  Question Available Choices Patient Response   COVID-19 Outbreak Screen:  Do you currently have a new onset of the following symptoms?        Fever/Chills, Cough, Shortness of air, Loss of taste or smell, No, Unknown  No (09/14/21 2036)   COVID-19 Outbreak Screen: In the last 14 days, have you had contact with anyone who is ill, has show any of the symptoms listed above and/or has been diagnosis with the 2019 Novel Coronavirus? This includes any immediate household members but excludes any patients with whom you have been in contact within your normal work duties wearing proper PPE, if you are a healthcare worker.  Yes, No, Unknown              No (09/14/21 2036)   COVID-19 Outbreak Screen: Who was notified? Free text (not recorded)   Ebola Screening Outbreak Screen: Have you traveled to the [place] or [place] within the past 21 days?  Yes, No, Unknown (not recorded)   Ebola Screening Outbreak Screen:  Do you have ANY of the following symptoms: Fever/Chills, Vomiting, Diarrhea, Fatigue, Headache, Muscle pain, Unexplained bleeding, Abdominal (stomach) pain, No, Unknown (not recorded)   Ebola Screening Outbreak Screen: Name of Person notified Free text (not recorded)   Travel Screen: Have you traveled in the last month? If so, to what country have you traveled? If [place] what state? Yes, No, Unknown  List of all countries  List of all States No (09/14/21 2036)  (not recorded)  (not recorded)   Infection Risk: Do you currently have the following symptoms?  (If cough is selected, the Tuberculosis Screen is performed.) Cough, Fever, Rash, No No (09/14/21 2036)   Tuberculosis Screen: Do you have any of the following Tuberculosis Risks?  · Have you lived or spent time with anyone who had or may have TB?  · Have you lived in or visited any of the following areas for more than one month: [place], [place], [place], [place] or [place], the [place] or [place]?  · Do you have HIV/AIDS?  · Have you lived in or worked in a nursing home, homeless shelter, correctional facility, or substance abuse treatment facility?   · No    If Yes do you have any of the following symptoms? Yes responses display to the right    If Yes, symptoms listed are:  Cough greater than or equal to 3 weeks, Loss of appetite, Unexplained weight loss, Night sweats, Bloody sputum or hemoptysis, Hoarseness, Fever, Fatigue, Chest pain, No (not recorded)  (not recorded)   Exposure Screen: Have you been exposed to any of these contagious diseases in the last month? Measles, Chickenpox, Meningitis, Pertussis, Whooping Cough, No No (09/14/21 2036)       Vital Signs (last day)     Date/Time   Temp   Temp src   Pulse   Resp   BP   Patient Position   SpO2    09/17/21 0638   97.8 (36.6)   Skin   66   18   103/64   Lying   99    09/17/21 0314   98.2 (36.8)   Skin   72   18   125/78   Lying   98    09/16/21 2310   98.9 (37.2)   Skin   72   18   114/69   Lying   97     09/16/21 1809   98.6 (37)   Skin   74   18   117/71   Lying   100    09/16/21 1500   98.2 (36.8)   Skin   75   18   125/76   Lying   100    09/16/21 1100   98.6 (37)   Skin   68   18   109/68   Lying   100    09/16/21 0700   98.6 (37)   Skin   59   18   106/70   Lying   97    09/16/21 0343   98.6 (37)   Skin   66   18   109/68   Lying   100              Oxygen Therapy (last day)     Date/Time   SpO2   Device (Oxygen Therapy)   Flow (L/min)   Oxygen Concentration (%)   ETCO2 (mmHg)    09/17/21 0638   99   room air   --   --   --    09/17/21 0314   98   room air   --   --   --    09/16/21 2310   97   room air   --   --   --    09/16/21 2000   --   room air   --   --   --    09/16/21 1809   100   room air   --   --   --    09/16/21 1500   100   room air   --   --   --    09/16/21 1100   100   room air   --   --   --    09/16/21 0700   97   room air   --   --   --    09/16/21 0343   100   room air   --   --   --              Lines, Drains & Airways    Active LDAs     Name:   Placement date:   Placement time:   Site:   Days:    Peripheral IV 09/14/21 2033 Anterior;Distal;Left;Upper Arm   09/14/21 2033    Arm   2                CIWA (since admission)     Date/Time CIWA-Ar Score    09/16/21 2000  0    09/15/21 2000  0          Medication Administration Report for Samir Enrique as of 09/17/21 0857    Legend:    Given Hold Not Given Due Canceled Entry Other Actions    Time Time (Time) Time  Time-Action       Discontinued     Completed     Future     MAR Hold     Linked           Medications 09/15/21 09/16/21 09/17/21    methocarbamol (ROBAXIN) tablet 750 mg  Dose: 750 mg  Freq: 3 Times Daily Route: PO  Start: 09/15/21 0900    1029     1609     2047        0850     1717     2141        0900     1600     2100           methylPREDNISolone (MEDROL) dose pack 4 mg  Dose: 4 mg  Freq: 4 Times Daily Tapering Route: PO  Start: 09/17/21 0730   End: 09/18/21 0729    Admin Instructions:   Day 3  Caution: Look alike/sound alike  drug alert. Take with food. Avoid grapefruit juice.       0730     1300     1800       2100             venlafaxine XR (EFFEXOR-XR) 24 hr capsule 150 mg  Dose: 150 mg  Freq: Daily With Breakfast Route: PO  Start: 09/15/21 0800    Admin Instructions:   Swallow whole; do not crush or chew.     1233          0850          0800            Future Medications  Medications 09/15/21 09/16/21 09/17/21       methylPREDNISolone (MEDROL) dose pack 4 mg  Dose: 4 mg  Freq: Before Breakfast Route: PO  Start: 09/20/21 0730   End: 09/21/21 0729    Admin Instructions:   Day 6  Caution: Look alike/sound alike drug alert. Take with food. Avoid grapefruit juice.           methylPREDNISolone (MEDROL) dose pack 4 mg  Dose: 4 mg  Freq: Nightly - One Time Route: PO  Start: 09/19/21 2100   End: 09/20/21 2059    Admin Instructions:   Day 5  Caution: Look alike/sound alike drug alert. Take with food. Avoid grapefruit juice.           methylPREDNISolone (MEDROL) dose pack 4 mg  Dose: 4 mg  Freq: Before Breakfast Route: PO  Start: 09/19/21 0730   End: 09/20/21 0729    Admin Instructions:   Day 5  Caution: Look alike/sound alike drug alert. Take with food. Avoid grapefruit juice.           methylPREDNISolone (MEDROL) dose pack 4 mg  Dose: 4 mg  Freq: 3 Times Daily Around Food Route: PO  Start: 09/18/21 0730   End: 09/19/21 0729    Admin Instructions:   Day 4  Caution: Look alike/sound alike drug alert. Take with food. Avoid grapefruit juice.          Completed Medications  Medications 09/15/21 09/16/21 09/17/21       dexamethasone (DECADRON) injection 10 mg  Dose: 10 mg  Freq: Once Route: IV  Start: 09/15/21 0323   End: 09/15/21 0440    Admin Instructions:   For IV administration. May be pushed over a minimum of 1 minute.     0440               gadobenate dimeglumine (MULTIHANCE) injection 16 mL  Dose: 16 mL  Freq: Once in Imaging Route: IV  Start: 09/15/21 1145   End: 09/15/21 1148    Admin Instructions:   Vesicant; admin as rapid bolus; flush  with 5 mL NS after admin or 20 mL for renal or aortoiliofemoral vasculature     1148 [C]               HYDROmorphone (DILAUDID) injection 1 mg  Dose: 1 mg  Freq: Once Route: IV  Start: 09/15/21 0052   End: 09/15/21 0143    Admin Instructions:       Caution: Look alike/sound alike drug alert    If given for pain, use the following pain scale:  Mild Pain = Pain Score of 1-3, CPOT 1-2  Moderate Pain = Pain Score of 4-6, CPOT 3-4  Severe Pain = Pain Score of 7-10, CPOT 5-8     0143               HYDROmorphone (DILAUDID) injection 1 mg  Dose: 1 mg  Freq: Once Route: IV  Start: 09/14/21 2346   End: 09/15/21 0029    Admin Instructions:       Caution: Look alike/sound alike drug alert    If given for pain, use the following pain scale:  Mild Pain = Pain Score of 1-3, CPOT 1-2  Moderate Pain = Pain Score of 4-6, CPOT 3-4  Severe Pain = Pain Score of 7-10, CPOT 5-8     0029               ketorolac (TORADOL) injection 15 mg  Dose: 15 mg  Freq: Every 8 Hours Route: IV  Start: 09/15/21 1500   End: 09/16/21 0628    Admin Instructions:       If given for pain, use the following pain scale:  Mild Pain = Pain Score of 1-3, CPOT 1-2  Moderate Pain = Pain Score of 4-6, CPOT 3-4  Severe Pain = Pain Score of 7-10, CPOT 5-8     1508     2347         0628              methocarbamol (ROBAXIN) tablet 750 mg  Dose: 750 mg  Freq: Once Route: PO  Start: 09/15/21 0052   End: 09/15/21 0141    0141               methylPREDNISolone (MEDROL) dose pack 24 mg  Dose: 24 mg  Freq: Once Route: PO  Start: 09/15/21 1730   End: 09/15/21 1741    Admin Instructions:   All day-1 doses may be given (up to 6 tablets) may be given at one time based on time of day.  Caution: Look alike/sound alike drug alert. Take with food. Avoid grapefruit juice.     1741               methylPREDNISolone (MEDROL) dose pack 4 mg  Dose: 4 mg  Freq: 3 Times Daily Around Food Route: PO  Start: 09/16/21 0730   End: 09/16/21 1717    Admin Instructions:   Day 2  Caution: Look  alike/sound alike drug alert. Take with food. Avoid grapefruit juice.      0850     1304     1717            methylPREDNISolone (MEDROL) dose pack 8 mg  Dose: 8 mg  Freq: Nightly - One Time Route: PO  Start: 09/16/21 2100   End: 09/16/21 2141    Admin Instructions:   Day 2  Caution: Look alike/sound alike drug alert. Take with food. Avoid grapefruit juice.      2141              ondansetron (ZOFRAN) injection 4 mg  Dose: 4 mg  Freq: Once Route: IV  Start: 09/14/21 2346   End: 09/15/21 0029    0029                    ,   Medication Administration Report for Samir Enrique as of 09/17/21 0857    Legend:    Given Hold Not Given Due Canceled Entry Other Actions    Time Time (Time) Time  Time-Action       Discontinued     Completed     Future     MAR Hold     Linked           Medications 09/15/21 09/16/21 09/17/21          and   Medication Administration Report for Samir Enrique as of 09/17/21 0857    Legend:    Given Hold Not Given Due Canceled Entry Other Actions    Time Time (Time) Time  Time-Action       Discontinued     Completed     Future     MAR Hold     Linked           Medications 09/15/21 09/16/21 09/17/21    acetaminophen (TYLENOL) tablet 650 mg  Dose: 650 mg  Freq: Every 4 Hours PRN Route: PO  PRN Reason: Mild Pain   Start: 09/15/21 0320    Admin Instructions:   Do not exceed 4 grams of acetaminophen in a 24 hr period. Max dose of 2gm for AST/ALT greater than 120 units/L      If given for pain, use the following pain scale:   Mild Pain = Pain Score of 1-3, CPOT 1-2  Moderate Pain = Pain Score of 4-6, CPOT 3-4  Severe Pain = Pain Score of 7-10, CPOT 5-8           aluminum-magnesium hydroxide-simethicone (MAALOX MAX) 400-400-40 MG/5ML suspension 15 mL  Dose: 15 mL  Freq: Every 6 Hours PRN Route: PO  PRN Reason: Heartburn  Start: 09/15/21 0320    Admin Instructions:   Maximum 60 mL in 24 hours.           HYDROmorphone (DILAUDID) injection 0.5 mg  Dose: 0.5 mg  Freq: Every 3 Hours PRN Route: IV  PRN Reason:  Severe Pain   Start: 09/15/21 0748    Admin Instructions:   If given for pain, use the following pain scale:  Mild Pain = Pain Score of 1-3, CPOT 1-2  Moderate Pain = Pain Score of 4-6, CPOT 3-4  Severe Pain = Pain Score of 7-10, CPOT 5-8     0922               ondansetron (ZOFRAN) tablet 4 mg  Dose: 4 mg  Freq: Every 6 Hours PRN Route: PO  PRN Reasons: Nausea,Vomiting  Start: 09/15/21 0320    Admin Instructions:   If BOTH ondansetron (ZOFRAN) and promethazine (PHENERGAN) are ordered use ondansetron first and THEN promethazine IF ondansetron is ineffective.          Or  ondansetron (ZOFRAN) injection 4 mg  Dose: 4 mg  Freq: Every 6 Hours PRN Route: IV  PRN Reasons: Nausea,Vomiting  Start: 09/15/21 0320    Admin Instructions:   If BOTH ondansetron (ZOFRAN) and promethazine (PHENERGAN) are ordered use ondansetron first and THEN promethazine IF ondansetron is ineffective.           oxyCODONE-acetaminophen (PERCOCET) 7.5-325 MG per tablet 1 tablet  Dose: 1 tablet  Freq: Every 4 Hours PRN Route: PO  PRN Reason: Moderate Pain   Start: 09/15/21 0748   End: 09/25/21 0747    Admin Instructions:   [CLAUDIO]    Do not exceed 4 grams of acetaminophen in a 24 hr period. Max dose of 2gm for AST/ALT greater than 120 units/L        If given for pain, use the following pain scale:   Mild Pain = Pain Score of 1-3, CPOT 1-2  Moderate Pain = Pain Score of 4-6, CPOT 3-4  Severe Pain = Pain Score of 7-10, CPOT 5-8     1233     1745     2347        0628              sodium chloride 0.9 % flush 10 mL  Dose: 10 mL  Freq: As Needed Route: IV  PRN Reason: Line Care  Start: 09/15/21 0320         Discontinued Medications  Medications 09/15/21 09/16/21 09/17/21       ketorolac (TORADOL) injection 15 mg  Dose: 15 mg  Freq: Every 6 Hours PRN Route: IV  PRN Reason: Severe Pain   Start: 09/15/21 0841   End: 09/15/21 1408    Admin Instructions:       If given for pain, use the following pain scale:  Mild Pain = Pain Score of 1-3, CPOT 1-2  Moderate Pain  = Pain Score of 4-6, CPOT 3-4  Severe Pain = Pain Score of 7-10, CPOT 5-8                    Orders (active)      Start     Ordered    09/20/21 0730  methylPREDNISolone (MEDROL) dose pack 4 mg  Before Breakfast      09/15/21 1635    09/19/21 2100  methylPREDNISolone (MEDROL) dose pack 4 mg  Nightly - One Time      09/15/21 1635    09/19/21 0730  methylPREDNISolone (MEDROL) dose pack 4 mg  Before Breakfast      09/15/21 1635    09/18/21 0730  methylPREDNISolone (MEDROL) dose pack 4 mg  3 Times Daily Around Food      09/15/21 1635    09/17/21 0730  methylPREDNISolone (MEDROL) dose pack 4 mg  4 Times Daily Tapering      09/15/21 1635    09/16/21 1519  MRI Cervical Spine Without Contrast  1 Time Imaging      09/16/21 1519    09/16/21 1518  MRI Thoracic Spine Without Contrast  1 Time Imaging      09/16/21 1519    09/16/21 0846  There is no order for bedrest- he needs to get up and moving as likely discharge today  Misc Nursing Order (Specify)  Once     Comments: There is no order for bedrest- he needs to get up and moving as likely discharge today    09/16/21 0846    09/15/21 1409  OT Consult: Eval & Treat  Once      09/15/21 1408    09/15/21 0900  methocarbamol (ROBAXIN) tablet 750 mg  3 Times Daily      09/15/21 0748    09/15/21 0842  PT Consult: Eval & Treat Functional Mobility Below Baseline  Once     Comments: Acute onset back pain    09/15/21 0842    09/15/21 0800  venlafaxine XR (EFFEXOR-XR) 24 hr capsule 150 mg  Daily With Breakfast      09/15/21 0322    09/15/21 0748  oxyCODONE-acetaminophen (PERCOCET) 7.5-325 MG per tablet 1 tablet  Every 4 Hours PRN      09/15/21 0748    09/15/21 0748  HYDROmorphone (DILAUDID) injection 0.5 mg  Every 3 Hours PRN      09/15/21 0748    09/15/21 0600  Incentive Spirometry  Every 4 Hours While Awake      09/15/21 0321    09/15/21 0400  Vital Signs  Every 4 Hours      09/15/21 0321    09/15/21 0321  Intake & Output  Every Shift      09/15/21 0321    09/15/21 0321  Code Status and  Medical Interventions:  Continuous      09/15/21 0321    09/15/21 0321  Maintain Sequential Compression Device  Continuous      09/15/21 0321    09/15/21 0321  Diet Regular  Diet Effective Now      09/15/21 0321    09/15/21 032  aluminum-magnesium hydroxide-simethicone (MAALOX MAX) 400-400-40 MG/5ML suspension 15 mL  Every 6 Hours PRN      09/15/21 0321    09/15/21 0320  ondansetron (ZOFRAN) tablet 4 mg  Every 6 Hours PRN      09/15/21 0321    09/15/21 0320  ondansetron (ZOFRAN) injection 4 mg  Every 6 Hours PRN      09/15/21 0321    09/15/21 0320  acetaminophen (TYLENOL) tablet 650 mg  Every 4 Hours PRN      09/15/21 0321    09/15/21 0320  sodium chloride 0.9 % flush 10 mL  As Needed      09/15/21 0321    09/15/21 0245  [facility] (on-call MD unless specified) Details  Once     Specialty:  Hospitalist  Provider:  Cordell Marcelo MD    09/15/21 0244    Unscheduled  Up With Assistance  As Needed      09/15/21 0321                     Physician Progress Notes      Chico Reddy MD at 21 1614              Name: Samir Enrique ADMIT: 2021   : 1985  PCP: Rosalba Verde MD    MRN: [id number] LOS: 0 days   AGE/SEX: 36 y.o. male  ROOM: [facility]   Subjective   Chief Complaint   Patient presents with   • Back Pain     Pain still moderate  Partially improved with meds  Had not yet ambulated this morning  Had MRI L spine    ROS  No f/c  No n/v  No cp/palp  No soa/cough    Objective   Vital Signs  Temp:  [97.9 °F (36.6 °C)-98.6 °F (37 °C)] 98.2 °F (36.8 °C)  Heart Rate:  [59-75] 75  Resp:  [18] 18  BP: (106-125)/(63-76) 125/76  SpO2:  [97 %-100 %] 100 %  on   ;   Device (Oxygen Therapy): room air  Body mass index is 22.83 kg/m².    Physical Exam  Constitutional:       Appearance: He is well-developed.   HENT:      Head: Normocephalic and atraumatic.   Eyes:      General: No scleral icterus.  Cardiovascular:      Rate and Rhythm: Normal rate and regular rhythm.      Heart sounds: Normal heart sounds.    Pulmonary:      Effort: Pulmonary effort is normal. No respiratory distress.      Breath sounds: Normal breath sounds.   Abdominal:      General: There is no distension.      Palpations: Abdomen is soft.      Tenderness: There is no abdominal tenderness.   Musculoskeletal:      Cervical back: Neck supple.   Neurological:      Mental Status: He is alert.   Psychiatric:         Behavior: Behavior normal.         Results Review:       I reviewed the patient's new clinical results.  Results from last 7 days   Lab Units 09/16/21  0643 09/15/21  0325   WBC 10*3/mm3 9.89 11.38*   HEMOGLOBIN g/dL 13.2 13.5   PLATELETS 10*3/mm3 212 203     Results from last 7 days   Lab Units 09/15/21  0325   SODIUM mmol/L 142   POTASSIUM mmol/L 3.9   CHLORIDE mmol/L 106   CO2 mmol/L 25.8   BUN mg/dL 9   CREATININE mg/dL 0.64*   GLUCOSE mg/dL 91   Estimated Creatinine Clearance: 175.5 mL/min (A) (by C-G formula based on SCr of 0.64 mg/dL (L)).    Results from last 7 days   Lab Units 09/15/21  0325   CALCIUM mg/dL 8.7         Coag     HbA1C No results found for: HGBA1C  Infection     Radiology(recent) XR Spine Thoracic 3 View    Result Date: 9/15/2021  Electronically signed by Khurram Duran M.D. on 09-15-21 at 0256    XR Spine Lumbar Complete 4+VW    Result Date: 9/15/2021  Electronically signed by Khurram Duran M.D. on 09-15-21 at 0257    MRI Lumbar Spine With & Without Contrast    Result Date: 9/15/2021  Transitional anatomy is appreciated with what is being considered to be lumbarization of S1. Careful correlation prior to any intervention is required given the presence of transitional anatomy. There is rotatory levoscoliosis of the lumbar spine and multilevel facet degenerative disease. Broad-based disc osteophyte complexes are noted as described above, most prominent of which is at L4-L5 which results in mild canal stenosis. There is no evidence of marrow edema to suggest fracture. See above.      No results found for: TROPONINT,  TROPONINI, BNP  No components found for: TSH;2    methocarbamol, 750 mg, Oral, TID  methylPREDNISolone, 4 mg, Oral, TID Around Food  [START ON 2021] methylPREDNISolone, 4 mg, Oral, 4x Daily Taper  [START ON 2021] methylPREDNISolone, 4 mg, Oral, TID Around Food  [START ON 2021] methylPREDNISolone, 4 mg, Oral, Before Breakfast  [START ON 2021] methylPREDNISolone, 4 mg, Oral, Tonight  [START ON 2021] methylPREDNISolone, 4 mg, Oral, Before Breakfast  methylPREDNISolone, 8 mg, Oral, Tonight  venlafaxine XR, 150 mg, Oral, Daily With Breakfast       Diet Regular      Assessment/Plan     Active Hospital Problems    Diagnosis  POA   • **Intractable low back pain [M54.5]  Yes   • Tobacco dependence [F17.200]  Yes   • Unable to ambulate [R26.2]  Yes   • Intractable pain [R52]  Yes      Resolved Hospital Problems   No resolved problems to display.       · Continue measures for pain control  · Steroids and NSAIDs added  · MRI L spine without clear source of symptoms- have consulted SAMIRA who have ordered MRI T and C spine  · Above medications      DW RN      Chico Reddy MD  [facility]  21  16:15 EDT    Electronically signed by Chico Reddy MD at 21 1616          Consult Notes       Stephanie Coleman APRN at 21 1312      Consult Orders    1. Inpatient Neurosurgery Consult [[id number]] ordered by Chico Reddy MD               [facility]   Consult Note    Patient Name: Samir Enrique  : 1985  MRN: [id number]  Primary Care Physician:  Rosalba Verde MD  Referring Physician: Cordell Marcelo MD  Date of admission: 2021    Inpatient Neurosurgery Consult  Consult performed by: Stephanie Coleman, APRN  Consult ordered by: Chico Reddy MD  Reason for consult: intractable back pain         Subjective   Subjective     Reason for Consult/ Chief Complaint: Intractable back pain    History of Present Illness  Samir Enrique is a 36  "y.o. male with no history of prior spine surgery who presented to [facility] emergency department last evening for complaints of severe, intractable back pain. The patient stated that he went laundry room after getting out of shower to retrieve some clothes. As he was leaning over to put his R leg into his underwear he felt an immediate \"pop\" and severe pain through the neck, thoracic and lumbar spine. He could not stand up but was able to \"stumble\" to a nearby couch. He laid there for 5 hours. Any attempt to move worsened his pain. He was able to get his father's attention and EMS was called to bring him to the ER. He reports some improvement since being started on muscle relaxers. His pain now seems to have settled in the R lower lumbar region just above the R buttock. He also notes a history of thoracic pain which worsens with prolonged sitting and standing. He reports the pain causes him to slouch. He notes no prior injury to his spine but did note an episode in which he woke up one morning 10 years ago and could not move his legs. He was brought to the ER. After approximately 6 hours his leg functioned returned to normal. The patient notes some episodes of spasticity in the legs. He denies any sense of heaviness or sensory loss in his legs. He does note that his legs will jerk/tremor in his lower extremities with rolling over in bed, etc.  This does not occur all the time.  The patient also denies any bowel or bladder incontinence.  He denies any upper lower extremity weakness.      MRI of the lumbar spine with and without contrast has been performed.  We were asked by the hospitalist to evaluate the patient and make further recommendations.      Review of Systems   Constitutional: Positive for activity change.   HENT: Negative.    Eyes: Negative.    Respiratory: Negative.    Cardiovascular: Negative.    Gastrointestinal: Negative.    Endocrine: Negative.    Genitourinary: Negative for difficulty " urinating, dysuria and flank pain.   Musculoskeletal: Positive for back pain and gait problem.   Skin: Negative.    Neurological: Positive for weakness.   Hematological: Negative.    Psychiatric/Behavioral: Negative.         Personal History     Past Medical History:   Diagnosis Date   • Bronchitis        Past Surgical History:   Procedure Laterality Date   • TYMPANOSTOMY TUBE PLACEMENT         Family History: family history is not on file. Otherwise pertinent FHx was reviewed and not pertinent to current issue.    Social History:  reports that he has been smoking. He has been smoking about 2.00 packs per day. He has never used smokeless tobacco. He reports current alcohol use of about 10.0 standard drinks of alcohol per week. He reports that he does not use drugs.    Home Medications:   Sulfamethoxazole-Trimethoprim and venlafaxine XR    Allergies:  Allergies   Allergen Reactions   • Ceclor [Cefaclor] Hives       Objective    Objective     Vitals:  Temp:  [97.9 °F (36.6 °C)-98.6 °F (37 °C)] 98.2 °F (36.8 °C)  Heart Rate:  [59-75] 75  Resp:  [18] 18  BP: (106-125)/(63-76) 125/76    Physical Exam  Vitals reviewed.   Constitutional:       General: He is not in acute distress.     Appearance: Normal appearance. He is well-developed. He is not ill-appearing or diaphoretic.   HENT:      Head: Normocephalic and atraumatic.      Nose: Nose normal.      Mouth/Throat:      Mouth: Mucous membranes are moist.      Pharynx: Oropharynx is clear.   Eyes:      General:         Right eye: No discharge.         Left eye: No discharge.      Conjunctiva/sclera: Conjunctivae normal.   Neck:      Trachea: No tracheal deviation.   Cardiovascular:      Rate and Rhythm: Normal rate.   Pulmonary:      Effort: Pulmonary effort is normal. No respiratory distress.   Abdominal:      General: Abdomen is flat. There is no distension.      Palpations: Abdomen is soft.      Tenderness: There is no abdominal tenderness.   Musculoskeletal:          General: Tenderness (to palpation in the R lower lumbar region just above the buttock. ) present. Normal range of motion.      Cervical back: Normal range of motion and neck supple. No rigidity.      Right lower leg: No edema.      Left lower leg: No edema.   Skin:     General: Skin is warm and dry.      Findings: No erythema.   Neurological:      Mental Status: He is alert and oriented to person, place, and time.      GCS: GCS eye subscore is 4. GCS verbal subscore is 5. GCS motor subscore is 6.      Sensory: No sensory deficit.      Motor: Weakness present. No abnormal muscle tone.      Coordination: Coordination normal.      Deep Tendon Reflexes: Reflexes are normal and symmetric. Reflexes normal.      Comments: No motor or sensory deficits.Difficulty to assess as any movement of legs brings about severe back pian. DTR's abnormal. Positive Almanzar's on the left; negative clonus bilaterally.       Psychiatric:         Behavior: Behavior is cooperative.         Thought Content: Thought content normal.       Result Review    Result Review:  I have personally reviewed the results from the time of this admission to 9/16/2021 15:48 EDT and agree with these findings:  []  Laboratory  []  Microbiology  [x]  Radiology  []  EKG/Telemetry   []  Cardiology/Vascular   []  Pathology  []  Old records  []  Other:  Most notable findings include:     MRI LUMBAR SPINE W/WO CONTRAST 9/15/2021    Transitional anatomy with lumbarized S1 segment. Leftward scoliosis w apex at L3/4.  Degenerative osteophyte complexes noted L3-4, L4-5 and L5-S1.  There is also degenerative facet arthropathy at the above-stated levels.  There is mild stenosis at L4-5.  No evidence of severe canal stenosis.    Results from last 7 days   Lab Units 09/16/21  0643 09/15/21  0325   WBC 10*3/mm3 9.89 11.38*   HEMOGLOBIN g/dL 13.2 13.5   HEMATOCRIT % 40.5 42.4   PLATELETS 10*3/mm3 212 203     .  Results from last 7 days   Lab Units 09/15/21  0325   SODIUM mmol/L  142   POTASSIUM mmol/L 3.9   CHLORIDE mmol/L 106   CO2 mmol/L 25.8   BUN mg/dL 9   CREATININE mg/dL 0.64*   GLUCOSE mg/dL 91   CALCIUM mg/dL 8.7       Assessment/Plan   Assessment / Plan     Brief Patient Summary:  Samir Enrique is a 36 y.o. male who developed severe, unrelenting lumbar pain after bending over to  a sock.  He presented to the emergency department at [facility].  MRI of the lumbar spine has been performed.  The patient has been started on oral dose steroids and muscle relaxers. Neurosurgery has been asked to evaluate the patient.    Active Hospital Problems:  Active Hospital Problems    Diagnosis    • **Intractable low back pain    • Tobacco dependence    • Unable to ambulate      Plan:     I have reviewed the MRI images of the lumbar spine with Dr. King.  There is clearly no severe pathology to explain the patient's current symptoms.  I am most concerned about the hyperreflexia on today's exam as well as the history of inability to stand or walk approximately 10 years ago for an unknown reason.  He states that those symptoms resolved in approximately 6 hours after he presented to the emergency department.  I will get an MRI of the cervical and thoracic spine just to be sure that there is no pathology there such ase a syrinx or severe cord compression.    Continue current dose steroids and muscle relaxers.  We will make further recommendations once the MRI studies are complete.    Electronically signed by RAYSHAWN Banegas, 09/16/21, 1:12 PM EDT.    Electronically signed by Stephanie Coleman APRN at 09/16/21 1548             Terri Purvis RN      Case Management   Case Management/Social Work   Signed   Date of Service:  09/15/21 1411   Creation Time:  09/15/21 1411            Signed             Discharge Planning Assessment  [facility]     Patient Name: Samir Enrique                       MRN: [id number]  Today's Date: 9/15/2021                      Admit Date: 9/14/2021

## 2021-09-17 NOTE — PLAN OF CARE
Goal Outcome Evaluation:              Outcome Summary: Pt was agreeable to therapy and ambulating slowly, but without assistance. MRI is still pending. Will follow up tomorrow to practice stairs if no intervention planned.

## 2021-09-18 VITALS
TEMPERATURE: 97.5 F | BODY MASS INDEX: 22.94 KG/M2 | HEART RATE: 69 BPM | OXYGEN SATURATION: 99 % | WEIGHT: 173.06 LBS | DIASTOLIC BLOOD PRESSURE: 68 MMHG | RESPIRATION RATE: 16 BRPM | SYSTOLIC BLOOD PRESSURE: 102 MMHG | HEIGHT: 73 IN

## 2021-09-18 PROBLEM — S39.012A STRAIN OF LUMBAR PARASPINAL MUSCLE: Status: ACTIVE | Noted: 2021-09-15

## 2021-09-18 PROCEDURE — 99231 SBSQ HOSP IP/OBS SF/LOW 25: CPT | Performed by: NEUROLOGICAL SURGERY

## 2021-09-18 PROCEDURE — 63710000001 METHYLPREDNISOLONE 4 MG TABLET THERAPY PACK 21 EACH DISP PACK: Performed by: INTERNAL MEDICINE

## 2021-09-18 PROCEDURE — 97110 THERAPEUTIC EXERCISES: CPT | Performed by: PHYSICAL THERAPIST

## 2021-09-18 RX ORDER — METHOCARBAMOL 750 MG/1
750 TABLET, FILM COATED ORAL 3 TIMES DAILY PRN
Qty: 21 TABLET | Refills: 0 | Status: SHIPPED | OUTPATIENT
Start: 2021-09-18

## 2021-09-18 RX ADMIN — METHOCARBAMOL TABLETS 750 MG: 750 TABLET, COATED ORAL at 16:20

## 2021-09-18 RX ADMIN — METHYLPREDNISOLONE 4 MG: 4 TABLET ORAL at 06:34

## 2021-09-18 RX ADMIN — VENLAFAXINE HYDROCHLORIDE 150 MG: 150 CAPSULE, EXTENDED RELEASE ORAL at 07:41

## 2021-09-18 RX ADMIN — METHYLPREDNISOLONE 4 MG: 4 TABLET ORAL at 12:25

## 2021-09-18 RX ADMIN — POLYETHYLENE GLYCOL 3350 17 G: 17 POWDER, FOR SOLUTION ORAL at 07:41

## 2021-09-18 RX ADMIN — METHYLPREDNISOLONE 4 MG: 4 TABLET ORAL at 16:20

## 2021-09-18 RX ADMIN — METHOCARBAMOL TABLETS 750 MG: 750 TABLET, COATED ORAL at 07:41

## 2021-09-18 NOTE — PLAN OF CARE
Goal Outcome Evaluation:              Outcome Summary: Pt was up ambulating in the room when PT arrrived. He ambualted down the castañeda to practice stairs. Educated on proper sequencing for stairs and pt navigated independently.  Pt is safe to d/c home when medically stable. PT will sign off.

## 2021-09-18 NOTE — THERAPY TREATMENT NOTE
Patient Name: Samir Enrique  : 1985    MRN: [id number]                              Today's Date: 2021       Admit Date: 2021    Visit Dx:     ICD-10-CM ICD-9-CM   1. Intractable pain  R52 780.96   2. Acute low back pain, unspecified back pain laterality, unspecified whether sciatica present  M54.5 724.2     Patient Active Problem List   Diagnosis   • Intractable low back pain   • Tobacco dependence   • Unable to ambulate     Past Medical History:   Diagnosis Date   • Bronchitis      Past Surgical History:   Procedure Laterality Date   • TYMPANOSTOMY TUBE PLACEMENT       General Information     Row Name 21 140          Physical Therapy Time and Intention    Document Type  therapy note (daily note);discharge evaluation/summary  -       User Key  (r) = Recorded By, (t) = Taken By, (c) = Cosigned By    Initials Name Provider Type    Patricia Bryan, PT Physical Therapist        Mobility     Row Name 21          Bed Mobility    Comment (Bed Mobility)  up in room  -[facility] Name 21 140          Transfers    Comment (Transfers)  up in room  -[facility] Name 21 140          Gait/Stairs (Locomotion)    [place] Level (Gait)  independent  -     Distance in Feet (Gait)  200  -KH     Deviations/Abnormal Patterns (Gait)  antalgic;gait speed decreased  -     [place] Level (Stairs)  stand by assist  -     Handrail Location (Stairs)  both sides  -     Number of Steps (Stairs)  4 x 2  -KH     Ascending Technique (Stairs)  step-to-step  -     Descending Technique (Stairs)  step-to-step  -       User Key  (r) = Recorded By, (t) = Taken By, (c) = Cosigned By    Initials Name Provider Type    Patricia Bryan, PT Physical Therapist        Obj/Interventions    No documentation.       Goals/Plan    No documentation.       Clinical Impression     Row Name 21          Pain    Additional Documentation  Pain Scale: Numbers  Pre/Post-Treatment (Group)  -     Row Name 09/18/21 1406          Pain Scale: Numbers Pre/Post-Treatment    Pretreatment Pain Rating  3/10  -     Posttreatment Pain Rating  3/10  -     Pain Location  back  -     Row Name 09/18/21 1406          Plan of Care Review    Outcome Summary  Pt was up ambulating in the room when PT arrrived. He ambualted down the castañeda to practice stairs. Educated on proper sequencing for stairs and pt navigated independently.  Pt is safe to d/c home when medically stable. PT will sign off.  -     Row Name 09/18/21 1406          Positioning and Restraints    Pre-Treatment Position  standing in room  -     Post Treatment Position  bed  -KH     In Bed  fowlers;call light within reach;notified nsg  -       User Key  (r) = Recorded By, (t) = Taken By, (c) = Cosigned By    Initials Name Provider Type    Patricia Bryan, PT Physical Therapist        Outcome Measures     Row Name 09/18/21 1409          How much help from another person do you currently need...    Turning from your back to your side while in flat bed without using bedrails?  4  -KH     Moving from lying on back to sitting on the side of a flat bed without bedrails?  4  -KH     Moving to and from a bed to a chair (including a wheelchair)?  4  -KH     Standing up from a chair using your arms (e.g., wheelchair, bedside chair)?  4  -KH     Climbing 3-5 steps with a railing?  4  -KH     To walk in hospital room?  4  -KH     AM-PAC 6 Clicks Score (PT)  24  -       User Key  (r) = Recorded By, (t) = Taken By, (c) = Cosigned By    Initials Name Provider Type    Patricia Bryan, PT Physical Therapist                       Physical Therapy Education                 Title: PT OT SLP Therapies (In Progress)     Topic: Physical Therapy (In Progress)     Point: Mobility training (Done)     Learning Progress Summary           Patient Acceptance, E, VU by FERCHO at 9/17/2021 1610    Acceptance, E,DAVID, ISABEL by PC at  9/16/2021 1449                   Point: Home exercise program (Not Started)     Learner Progress:  Not documented in this visit.          Point: Body mechanics (Done)     Learning Progress Summary           Patient Acceptance, E,D, DU by PC at 9/16/2021 1449                   Point: Precautions (Done)     Learning Progress Summary           Patient Acceptance, E, VU by  at 9/17/2021 1610    Acceptance, E,D, DU by  at 9/16/2021 1449                               User Key     Initials Effective Dates Name Provider Type [facility] 06/16/21 -  Patricia Martinez, PT Physical Therapist PT    PC 06/16/21 -  Polly Hammonds PT Physical Therapist PT              PT Recommendation and Plan     Outcome Summary: Pt was up ambulating in the room when PT arrrived. He ambualted down the castañeda to practice stairs. Educated on proper sequencing for stairs and pt navigated independently.  Pt is safe to d/c home when medically stable. PT will sign off.     Time Calculation:   PT Charges     Row Name 09/18/21 1411             Time Calculation    Start Time  1037  -      Stop Time  1048  -      Time Calculation (min)  11 min  -         Time Calculation- PT    Total Timed Code Minutes- PT  11 minute(s)  -        User Key  (r) = Recorded By, (t) = Taken By, (c) = Cosigned By    Initials Name Provider Type    Patricia Bryan, PT Physical Therapist        Therapy Charges for Today     Code Description Service Date Service Provider Modifiers Qty    86087194176 HC PT THER PROC EA 15 MIN 9/17/2021 Patricia Martinez, PT GP 1    90096889089 HC PT THER PROC EA 15 MIN 9/18/2021 Patricia Martinez, PT GP 1          PT G-Codes  Outcome Measure Options: AM-PAC 6 Clicks Basic Mobility (PT)  AM-PAC 6 Clicks Score (PT): 24  AM-PAC 6 Clicks Score (OT): 18    Patricia Martinez PT  9/18/2021

## 2021-09-18 NOTE — PROGRESS NOTES
Neurosurgery progress note      Chief complaint: Lower back pain    Subjective: No events reported overnight.  He reports that he is continuing to have severe lower back pain at times however it is significantly improved since admission.      Objective:  Vitals:    09/17/21 2315 09/18/21 0337 09/18/21 0650 09/18/21 1057   BP: 107/59 108/65 105/66 119/76   BP Location: Right arm Right arm Right arm Right arm   Patient Position: Lying Lying Lying Lying   Pulse: 75 64  72   Resp: 16 16 16 16   Temp: 97.7 °F (36.5 °C) 97.7 °F (36.5 °C) 98 °F (36.7 °C) 98.2 °F (36.8 °C)   TempSrc: Skin Skin Skin Skin   SpO2: 99% 98% 99% 98%   Weight:       Height:           Physical exam  Awake, alert, oriented x3  Face symmetric  Speech is fluent and clear  No pronator drift  Motor exam  Bilateral deltoids 5/5, bilateral biceps 5/5, bilateral triceps 5/5, bilateral wrist extension 5/5 bilateral hand  5/5  Bilateral hip flexion 5/5, bilateral knee extension 5/5, bilateral DF/PF 5/5  No clonus  No Ashley's reflex  Able to detect  light touch in all 4 extremities    Imaging  I personally reviewed the following imaging studies: MRI cervical, thoracic, and lumbar spine from 9/17/2021  I reviewed all of the MRI images. There is no evidence of severe central canal stenosis throughout the cervical, thoracic, lumbar spine. There is no evidence of mass lesions. No focal disc herniation. No significant neuroforaminal stenosis. No obvious fractures. There is a 2 cm cyst within the nasopharynx      Assessment/plan  36-year-old male with severe lower back pain with a previous history of similar pain and inability to move his legs  -MRI of the cervical spine shows a 2 cm lesion within the nasopharynx commend outpatient ENT consult and follow-up MRI brain for further evaluation  -The MRI of the cervical, thoracic, lumbar spine show no evidence of severe canal stenosis, focal disc herniation, neuroforaminal stenosis, or fracture to explain his  symptoms.   -No further neurosurgical intervention indicated at this time.  His pain is most likely muscular and will hopefully improve with time.  I explained to him that he can follow-up with me in the next 2 to 3 weeks if he is continuing to have severe back pain and I can place a referral for physical therapy and pain management.

## 2021-09-18 NOTE — PROGRESS NOTES
Name: Samir Enrique ADMIT: 2021   : 1985  PCP: Rosalba Verde MD    MRN: [id number] LOS: 2 days   AGE/SEX: 36 y.o. male  ROOM: [facility]   Subjective   Chief Complaint   Patient presents with   • Back Pain     Seems to be feeling better overall. Some difficulty getting out of bed but once he is up he is ambulating comfortably. No issues with urination or bowel movement. Feels muscle relaxer providing more relief than Percocet.      Objective   Vital Signs  Temp:  [97.7 °F (36.5 °C)-98.2 °F (36.8 °C)] 98.2 °F (36.8 °C)  Heart Rate:  [64-75] 72  Resp:  [16] 16  BP: (105-119)/(59-76) 119/76  SpO2:  [98 %-99 %] 98 %  on   ;   Device (Oxygen Therapy): room air  Body mass index is 22.83 kg/m².    Physical Exam  Vitals and nursing note reviewed.   Constitutional:       General: He is not in acute distress.     Appearance: Normal appearance. He is not ill-appearing.   Pulmonary:      Effort: Pulmonary effort is normal.   Neurological:      Mental Status: He is alert. Mental status is at baseline.   Psychiatric:         Mood and Affect: Mood normal.         Results Review:       I reviewed the patient's new clinical results.  Results from last 7 days   Lab Units 21  0643 09/15/21  0325   WBC 10*3/mm3 9.89 11.38*   HEMOGLOBIN g/dL 13.2 13.5   PLATELETS 10*3/mm3 212 203     Results from last 7 days   Lab Units 09/15/21  0325   SODIUM mmol/L 142   POTASSIUM mmol/L 3.9   CHLORIDE mmol/L 106   CO2 mmol/L 25.8   BUN mg/dL 9   CREATININE mg/dL 0.64*   GLUCOSE mg/dL 91   Estimated Creatinine Clearance: 175.5 mL/min (A) (by C-G formula based on SCr of 0.64 mg/dL (L)).      methocarbamol, 750 mg, Oral, TID  methylPREDNISolone, 4 mg, Oral, TID Around Food  [START ON 2021] methylPREDNISolone, 4 mg, Oral, Before Breakfast  [START ON 2021] methylPREDNISolone, 4 mg, Oral, Tonight  [START ON 2021] methylPREDNISolone, 4 mg, Oral, Before Breakfast  polyethylene glycol, 17 g, Oral, Daily  venlafaxine XR, 150  mg, Oral, Daily With Breakfast       Diet Regular      Assessment/Plan      Active Hospital Problems    Diagnosis  POA   • **Intractable low back pain [M54.5]  Yes   • Tobacco dependence [F17.200]  Yes   • Unable to ambulate [R26.2]  Yes      Resolved Hospital Problems   No resolved problems to display.       36 y.o. male with Intractable low back pain.    MRI cervical and thoracic spine reviewed. Discussed with patient incidental finding of possible cyst. Discussed that he should follow-up with PCP to consider further dedicated imaging. Unlikely related to presentation. Will await neurosurgical interpretation of scans. He feels well enough to consider discharge later today. He has not required IV Dilaudid in a few days. Otherwise he has only on oral steroid, muscle relaxant and Percocet.      · SCDs for DVT prophylaxis.  · Full code.  · Discussed with patient.  · Anticipate discharge home possibly later today if okay with neurosurgery.      Dev Concepcion MD  [facility]  09/18/21  12:45 EDT

## 2021-09-18 NOTE — PLAN OF CARE
Goal Outcome Evaluation:  Plan of Care Reviewed With: patient        Progress: no change  Outcome Summary: Pt remains stable. Ambulating with 1 assist. Percocet for pain control. MRIs completed last night. Miralax for constipation. Voiding per BRP. CIWAA scores 0. D/C home when appropriate. WCTM.

## 2021-09-18 NOTE — DISCHARGE SUMMARY
Patient Name: Samir Enrique  : 1985  MRN: [id number]    Date of Admission: 2021  Date of Discharge:  2021  Primary Care Physician: Rosalba Verde MD      Chief Complaint:   Back Pain      Discharge Diagnoses     Active Hospital Problems    Diagnosis  POA   • **Strain of lumbar paraspinal muscle [S39.012A]  Yes   • Tobacco dependence [F17.200]  Yes   • Unable to ambulate [R26.2]  Yes      Resolved Hospital Problems   No resolved problems to display.        Hospital Course     Mr. Enrique is a 36 y.o. male who presented to [facility] initially complaining of back pain.  Please see the admitting history and physical for further details.  He was found to have intractable pain and was admitted to the hospital for further evaluation and treatment.  He was admitted for pain control and neurosurgical consultation.  Additional imaging studies were performed with results outlined below.  He was started on empiric steroids and muscle relaxers.  Upon review of imaging studies today neurosurgery feels most likely this is musculoskeletal and should resolve on its own.  Steroids were discontinued.  Will discharge today with a muscle relaxant.    As stated in progress note MRI did identify cyst in his pharyngeal area.  Discussed with patient that he should follow-up with PCP to consider ENT referral and/or additional imaging studies.  Radiology suggested MRI with and without contrast.      Day of Discharge     Subjective:  See today's progress note    Physical Exam:  Temp:  [97.5 °F (36.4 °C)-98.2 °F (36.8 °C)] 97.5 °F (36.4 °C)  Heart Rate:  [64-75] 69  Resp:  [16] 16  BP: (102-119)/(59-76) 102/68  Body mass index is 22.83 kg/m².  Physical Exam    Consultants     Consult Orders (all) (From admission, onward)     Start     Ordered    21 1157  Inpatient Neurosurgery Consult  Once     Specialty:  Neurosurgery  Provider:  Travis Frances MD    21 1156              Procedures     Imaging  Results (All)     Procedure Component Value Units Date/Time    MRI Thoracic Spine Without Contrast [[id number]] Collected: 09/17/21 1941     Updated: 09/17/21 1949    Narrative:      MR SCAN OF THE CERVICAL AND THORACIC SPINE WITHOUT CONTRAST     HISTORY: Thoracic pain and hyperreflexia. Possible myelopathy.     TECHNIQUE: The MR scan was performed through the cervical and thoracic  spine with sagittal and axial images without contrast.      FINDINGS: The following findings are present:  1. The cervicomedullary junction is normal in position. The cervical and  thoracic spinal cord appear normal in size and position. There is no  evidence of myelopathy.  2. The foramen magnum and C1-2 levels appear normal. At C2-3 there is no  disc or bony abnormality.  3. At C3-4 there is no disc or bony abnormality.  4. At C4-5 there is a minimal posterior disc bulge without central or  foraminal encroachment.  5. At C5-6 there is a slight posterior disc bulge without central or  foraminal encroachment.  6. At C6-7 there is a mild right paracentral disc bulge partially  effacing the anterior epidural space. There is no central or foraminal  encroachment.  7. The C7-T1 level is unremarkable. In the thoracic spine the spinal  cord is unremarkable as described above. There is no central or  foraminal encroachment throughout the thoracic spine.  8. There is a somewhat lobulated mass in the posterior nasopharynx at  the midline measuring approximately 2 x 2.4 cm. This may represent a  large Thornwaldt cyst but the signal characteristics are somewhat  atypical. Follow-up imaging with dedicated MRI scan without and with  intravenous contrast may be helpful.     This report was finalized on 9/17/2021 7:46 PM by Dr. Jonathan Antonio M.D.       MRI Cervical Spine Without Contrast [[id number]] Collected: 09/17/21 1941     Updated: 09/17/21 1949    Narrative:      MR SCAN OF THE CERVICAL AND THORACIC SPINE WITHOUT CONTRAST     HISTORY: Thoracic  pain and hyperreflexia. Possible myelopathy.     TECHNIQUE: The MR scan was performed through the cervical and thoracic  spine with sagittal and axial images without contrast.      FINDINGS: The following findings are present:  1. The cervicomedullary junction is normal in position. The cervical and  thoracic spinal cord appear normal in size and position. There is no  evidence of myelopathy.  2. The foramen magnum and C1-2 levels appear normal. At C2-3 there is no  disc or bony abnormality.  3. At C3-4 there is no disc or bony abnormality.  4. At C4-5 there is a minimal posterior disc bulge without central or  foraminal encroachment.  5. At C5-6 there is a slight posterior disc bulge without central or  foraminal encroachment.  6. At C6-7 there is a mild right paracentral disc bulge partially  effacing the anterior epidural space. There is no central or foraminal  encroachment.  7. The C7-T1 level is unremarkable. In the thoracic spine the spinal  cord is unremarkable as described above. There is no central or  foraminal encroachment throughout the thoracic spine.  8. There is a somewhat lobulated mass in the posterior nasopharynx at  the midline measuring approximately 2 x 2.4 cm. This may represent a  large Thornwaldt cyst but the signal characteristics are somewhat  atypical. Follow-up imaging with dedicated MRI scan without and with  intravenous contrast may be helpful.     This report was finalized on 9/17/2021 7:46 PM by Dr. Jonathan Antonio M.D.       MRI Lumbar Spine With & Without Contrast [[id number]] Collected: 09/15/21 1330     Updated: 09/16/21 1619    Narrative:      MRI EXAMINATION OF THE LUMBAR SPINE WITH AND WITHOUT CONTRAST     HISTORY: Back pain.     COMPARISON: No prior MRI or CT examination of the lumbar spine is  available for comparison.     FINDINGS:      There is moderate rotatory levoscoliosis of the lumbar spine with apex  at the level of L3-L4. The chest x-ray from 03/29/2018 demonstrated  12  rib-bearing thoracic vertebral bodies.     L1-L2: There is no evidence of disc bulge or herniation.     L2-L3: There is no evidence of disc bulge or herniation     L3-L4: Mild facet degenerative disease is present bilaterally.     L4-L5: A mild central disc osteophyte complex is present which abuts and  mildly flattens the ventral surface of the thecal sac. Mild foraminal  stenosis is present bilaterally secondary to extension of disc material  into the neural foramen.     L5-S1: Mild facet degenerative disease is present bilaterally. A central  disc osteophyte complex is present with no evidence of herniation.  Transitional anatomy is appreciated with what is being considered to be  lumbarization of S1.      S1-S2: Mild facet degenerative disease is present bilaterally.     After contrast administration there was enhancement of the posterior  aspect of the disc at L5-S1 centrally.       Impression:      Transitional anatomy is appreciated with what is being  considered to be lumbarization of S1. Careful correlation prior to any  intervention is required given the presence of transitional anatomy.  There is rotatory levoscoliosis of the lumbar spine and multilevel facet  degenerative disease. Broad-based disc osteophyte complexes are noted as  described above, most prominent of which is at L4-L5 which results in  mild canal stenosis. There is no evidence of marrow edema to suggest  fracture. See above.     This report was finalized on 9/16/2021 4:16 PM by Dr. Dhruv Rondon M.D.       XR Spine Lumbar Complete 4+VW [[id number]] Collected: 09/15/21 0258     Updated: 09/15/21 0258    Narrative:        Patient: MARIS BLACKMON  Time Out: 02:57  Exam(s): FILM L SPINE 4+ VIEWS     EXAM:    XR Lumbosacral Spine, 4 or 5 Views    CLINICAL HISTORY:     Reason for exam: Lower back pain.    TECHNIQUE:    Frontal, lateral and oblique views of the lumbar spine.    COMPARISON:    No relevant prior studies  available.    FINDINGS:    Vertebrae:  Unremarkable.  No fracture.  Normal alignment.    Sacrum coccyx:  Unremarkable as visualized.  No acute fracture.    Disc spaces:  No acute findings.  No significant narrowing.    Soft tissues:  Unremarkable.    IMPRESSION: No acute findings    Impression:          Electronically signed by Khurram Duran M.D. on 09-15-21 at 0257    XR Spine Thoracic 3 View [[id number]] Collected: 09/15/21 0256     Updated: 09/15/21 0256    Narrative:        Patient: MARIS BLACKMON  Time Out: 02:56  Exam(s): FILM T SPINE     EXAM:    XR Thoracic Spine, 2 Views    CLINICAL HISTORY:     Reason for exam: Mid back pain.    TECHNIQUE:    Frontal and lateral views of the thoracic spine.    COMPARISON:    No relevant prior studies available.    FINDINGS:    Vertebrae:  Mild mid thoracic scoliosis convexed to the right.  No   fracture.  Normal alignment.    Disc spaces:  No acute findings.  No significant narrowing.    Soft tissues:  Unremarkable.    IMPRESSION:         No acute findings in the thoracic spine.      Impression:          Electronically signed by Khurram Duran M.D. on 09-15-21 at 0256            Pertinent Labs     Results from last 7 days   Lab Units 09/16/21  0643 09/15/21  0325   WBC 10*3/mm3 9.89 11.38*   HEMOGLOBIN g/dL 13.2 13.5   PLATELETS 10*3/mm3 212 203     Results from last 7 days   Lab Units 09/15/21  0325   SODIUM mmol/L 142   POTASSIUM mmol/L 3.9   CHLORIDE mmol/L 106   CO2 mmol/L 25.8   BUN mg/dL 9   CREATININE mg/dL 0.64*   GLUCOSE mg/dL 91   Estimated Creatinine Clearance: 175.5 mL/min (A) (by C-G formula based on SCr of 0.64 mg/dL (L)).    Results from last 7 days   Lab Units 09/15/21  0325   CALCIUM mg/dL 8.7               Invalid input(s): LDLCALC      Results from last 7 days   Lab Units 09/15/21  0325   COVID19  Not Detected       Test Results Pending at Discharge       Discharge Details        Discharge Medications      New Medications      Instructions Start Date    methocarbamol 750 MG tablet  Commonly known as: ROBAXIN   750 mg, Oral, 3 Times Daily PRN         Continue These Medications      Instructions Start Date   venlafaxine  MG 24 hr capsule  Commonly known as: EFFEXOR-XR   150 mg, Oral, Daily             Allergies   Allergen Reactions   • Ceclor [Cefaclor] Hives       Discharge Disposition:  Home or Self Care      Discharge Diet:  Diet Order   Procedures   • Diet Regular       Discharge Activity:   Activity Instructions     Activity as Tolerated            CODE STATUS:    Code Status and Medical Interventions:   Ordered at: 09/15/21 0321     Code Status:    CPR     Medical Interventions (Level of Support Prior to Arrest):    Full       No future appointments.  Follow-up Information     Rosalba Verde MD Follow up in 2 week(s).    Specialty: Internal Medicine  Contact information:  [address]  [address]  [place] [address]  [phone]             Titus King MD Follow up.    Specialty: Neurosurgery  Contact information:  [address]  [address]  [place] [address]  [phone]             [facility] Follow up.    Contact information:  [address]  [place] [address]  [phone]                 Time Spent on Discharge:  Greater than 30 minutes      Dev Concepcion MD  [facility]  09/18/21  17:41 EDT

## 2021-09-18 NOTE — PLAN OF CARE
Goal Outcome Evaluation:   Minimal c/o pain today. Pt ambulating ind to BR. VSS. On steroid dose pack. Waiting for neuro to clear. Will cont to monitor.

## 2021-09-20 NOTE — PAYOR COMM NOTE
"DISCHARGED  REF #[id number]        Samir Enrique (36 y.o. Male)     Date of Birth Social Security Number Address Home Phone MRN    1985  [address] [id number] [phone] [id number]    [employer] Marital Status          None Single       Admission Date Admission Type Admitting Provider Attending Provider Department, Room/Bed    9/14/21 Emergency Cordell Marcelo MD  [address], P890/1    Discharge Date Discharge Disposition Discharge Destination        9/18/2021 Home or Self Care              Attending Provider: (none)   Allergies: Ceclor [Cefaclor]    Isolation: None   Infection: None   Code Status: Prior    Ht: 185.4 cm (73\")   Wt: 78.5 kg (173 lb 1 oz)    Admission Cmt: None   Principal Problem: Strain of lumbar paraspinal muscle [S39.012A]                 Active Insurance as of 9/14/2021     Primary Coverage     Payor Plan Insurance Group Employer/Plan Group    [employer] [facility] [employer] [facility] [id number]     Payor Plan Address Payor Plan Phone Number Payor Plan Fax Number Effective Dates    [address] [phone]  1/1/2016 - None Entered    [place] [address]       Subscriber Name Subscriber Birth Date Member ID       SAMIR ENRIQUE 1985 [id number]                 Emergency Contacts      (Rel.) Home Phone Work Phone Mobile Phone    NARA Enrique Jhony (Father) -- -- [phone]            Discharge Order (From admission, onward)     Start     Ordered    09/18/21 1740  Discharge patient  Once     Expected Discharge Date: 09/18/21    Discharge Disposition: Home or Self Care    Physician of Record for Attribution - Please select from Treatment Team: DANIEL MENDEZ [[id number]]    Review needed by CMO to determine Physician of Record: No       Question Answer Comment   Physician of Record for Attribution - Please select from Treatment Team DANIEL MENDEZ    Review needed by CMO to determine Physician of Record No        09/18/21 " 2020

## 2021-09-20 NOTE — CASE MANAGEMENT/SOCIAL WORK
Case Management Discharge Note      Final Note: Home with family support.         Selected Continued Care - Discharged on 9/18/2021 Admission date: 9/14/2021 - Discharge disposition: Home or Self Care    Destination    No services have been selected for the patient.              Durable Medical Equipment    No services have been selected for the patient.              Dialysis/Infusion    No services have been selected for the patient.              Home Medical Care    No services have been selected for the patient.              Therapy    No services have been selected for the patient.              Community Resources    No services have been selected for the patient.              Community & DME    No services have been selected for the patient.                       Final Discharge Disposition Code: 01 - home or self-care

## 2021-10-19 ENCOUNTER — TRANSCRIBE ORDERS (OUTPATIENT)
Dept: ADMINISTRATIVE | Facility: HOSPITAL | Age: 36
End: 2021-10-19

## 2021-10-19 DIAGNOSIS — J39.2: Primary | ICD-10-CM

## 2021-11-11 ENCOUNTER — HOSPITAL ENCOUNTER (OUTPATIENT)
Dept: MRI IMAGING | Facility: HOSPITAL | Age: 36
Discharge: HOME | End: 2021-11-11
Admitting: INTERNAL MEDICINE

## 2021-11-11 DIAGNOSIS — J39.2: ICD-10-CM

## 2021-11-11 PROCEDURE — 0 GADOBENATE DIMEGLUMINE 529 MG/ML SOLUTION: Performed by: INTERNAL MEDICINE

## 2021-11-11 PROCEDURE — A9577 INJ MULTIHANCE: HCPCS | Performed by: INTERNAL MEDICINE

## 2021-11-11 PROCEDURE — 70543 MRI ORBT/FAC/NCK W/O &W/DYE: CPT

## 2021-11-11 RX ADMIN — GADOBENATE DIMEGLUMINE 17 ML: 529 INJECTION, SOLUTION INTRAVENOUS at 16:40

## 2024-11-05 ENCOUNTER — OFFICE VISIT (OUTPATIENT)
Dept: SURGERY | Facility: CLINIC | Age: 39
End: 2024-11-05
Payer: COMMERCIAL

## 2024-11-05 VITALS
WEIGHT: 189.6 LBS | OXYGEN SATURATION: 98 % | SYSTOLIC BLOOD PRESSURE: 130 MMHG | DIASTOLIC BLOOD PRESSURE: 84 MMHG | BODY MASS INDEX: 25.13 KG/M2 | HEART RATE: 109 BPM | HEIGHT: 73 IN

## 2024-11-05 DIAGNOSIS — R10.32 LEFT GROIN PAIN: Primary | ICD-10-CM

## 2024-11-05 PROCEDURE — 99203 OFFICE O/P NEW LOW 30 MIN: CPT | Performed by: SURGERY

## 2024-11-05 RX ORDER — DEXTROAMPHETAMINE SACCHARATE, AMPHETAMINE ASPARTATE, DEXTROAMPHETAMINE SULFATE, AND AMPHETAMINE SULFATE 3.75; 3.75; 3.75; 3.75 MG/1; MG/1; MG/1; MG/1
TABLET ORAL
COMMUNITY
Start: 2023-11-09

## 2024-11-05 NOTE — LETTER
November 5, 2024     Rosalba Verde MD     Patient: Samir Enrique   YOB: 1985   Date of Visit: 11/5/2024     Dear Rosalba Verde MD:       Thank you for referring Samir Enrique to me for evaluation. Below are the relevant portions of my assessment and plan of care.    If you have questions, please do not hesitate to call me. I look forward to following Samir along with you.         Sincerely,        Teddy Pereira Jr., MD        CC: No Recipients    Teddy Pereira Jr., MD  11/05/24 1330  Sign when Signing Visit  Subjective  Samir Enrique is a 39 y.o. male who presents to the office in surgical consultation from Rosalba Verde MD for left groin pain.    History of present illness  The patient recently developed a work shed in the backyard that involved heavy lifting and awkward positions to complete the work.  In the process of doing that he developed some left lower back pain that eventually radiated towards the left groin.  He now has some pain in those areas that involve the lower back, left back, and left groin.  Some radiation to the left testicle.  He has not noticed a bulge.  The pain is not worsened with activity but he notices it more when he sits or when he lies on his back or left side.  He has no pain when he lies on his stomach or his right side.  There are no distinct radicular symptoms.    Review of Systems   Constitutional:  Negative for fatigue and fever.   Gastrointestinal:  Negative for abdominal pain, blood in stool, constipation, diarrhea, nausea and vomiting.     Past Medical History:   Diagnosis Date   • Bronchitis      Past Surgical History:   Procedure Laterality Date   • TYMPANOSTOMY TUBE PLACEMENT       Family History   Problem Relation Age of Onset   • Arthritis Mother    • Arthritis Maternal Grandmother      Social History     Socioeconomic History   • Marital status: Single   Tobacco Use   • Smoking status: Every Day     Current packs/day: 2.00     Average packs/day: 2.0  packs/day for 20.0 years (40.0 ttl pk-yrs)     Types: Cigarettes   • Smokeless tobacco: Never   Vaping Use   • Vaping status: Never Used   Substance and Sexual Activity   • Alcohol use: Not Currently     Alcohol/week: 10.0 standard drinks of alcohol     Comment: Stopped drinking alcohol in early 2023   • Drug use: No   • Sexual activity: Yes     Partners: Female     Birth control/protection: Hysterectomy       Objective  Physical Exam  Constitutional:       Appearance: Normal appearance. He is well-developed. He is not toxic-appearing.   Eyes:      General: No scleral icterus.  Pulmonary:      Effort: Pulmonary effort is normal. No respiratory distress.   Abdominal:      Palpations: Abdomen is soft.      Tenderness: There is no abdominal tenderness.      Hernia: There is no hernia in the left inguinal area or right inguinal area.      Comments: There may be a mild bulging at the internal ring but no distinct hernia is palpable.   Skin:     General: Skin is warm and dry.   Neurological:      Mental Status: He is alert and oriented to person, place, and time.   Psychiatric:         Behavior: Behavior normal.         Thought Content: Thought content normal.         Judgment: Judgment normal.              Assessment & Plan    1.  Left groin pain: The patient has pain involving the lower back, left back, and left groin.  There is no palpable inguinal hernia to explain his symptoms.  It is much more likely to be related to strain of his lower back for musculature due to the physical activity he was performing while building his shed.  It may also be pain related to the hip.  There is no indication for a left inguinal hernia repair at this time.  He may need evaluation by physical therapy if his symptoms continue.  He will follow-up with me on an as-needed basis.

## 2024-12-10 ENCOUNTER — OFFICE VISIT (OUTPATIENT)
Dept: SLEEP MEDICINE | Facility: HOSPITAL | Age: 39
End: 2024-12-10
Payer: COMMERCIAL

## 2024-12-10 VITALS — BODY MASS INDEX: 24.78 KG/M2 | OXYGEN SATURATION: 98 % | HEART RATE: 96 BPM | WEIGHT: 187 LBS | HEIGHT: 73 IN

## 2024-12-10 DIAGNOSIS — F17.200 TOBACCO DEPENDENCE: Primary | ICD-10-CM

## 2024-12-10 DIAGNOSIS — G47.52 DREAM ENACTMENT BEHAVIOR: ICD-10-CM

## 2024-12-10 DIAGNOSIS — F51.9 SEVERE MORNING SLEEP INERTIA: ICD-10-CM

## 2024-12-10 DIAGNOSIS — G47.59 OTHER PARASOMNIA: ICD-10-CM

## 2024-12-10 DIAGNOSIS — G47.33 OSA (OBSTRUCTIVE SLEEP APNEA): ICD-10-CM

## 2024-12-10 PROBLEM — G47.50 PARASOMNIA: Status: ACTIVE | Noted: 2024-12-10

## 2024-12-10 PROBLEM — G47.419 NARCOLEPSY WITHOUT CATAPLEXY: Status: ACTIVE | Noted: 2024-12-10

## 2024-12-10 PROCEDURE — 99204 OFFICE O/P NEW MOD 45 MIN: CPT | Performed by: PSYCHIATRY & NEUROLOGY
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
## 2025-02-19 ENCOUNTER — HOSPITAL ENCOUNTER (OUTPATIENT)
Dept: SLEEP MEDICINE | Facility: HOSPITAL | Age: 40
Discharge: HOME | End: 2025-02-19
Admitting: PSYCHIATRY & NEUROLOGY
Payer: COMMERCIAL

## 2025-02-19 DIAGNOSIS — G47.33 OSA (OBSTRUCTIVE SLEEP APNEA): ICD-10-CM

## 2025-02-19 DIAGNOSIS — G47.52 DREAM ENACTMENT BEHAVIOR: ICD-10-CM

## 2025-02-19 DIAGNOSIS — G47.59 OTHER PARASOMNIA: ICD-10-CM

## 2025-02-19 DIAGNOSIS — F51.9 SEVERE MORNING SLEEP INERTIA: ICD-10-CM

## 2025-02-19 PROCEDURE — 95810 POLYSOM 6/> YRS 4/> PARAM: CPT

## 2025-03-18 DIAGNOSIS — G47.59 OTHER PARASOMNIA: ICD-10-CM

## 2025-03-18 DIAGNOSIS — F51.9 SEVERE MORNING SLEEP INERTIA: ICD-10-CM

## 2025-03-18 DIAGNOSIS — G47.33 OSA (OBSTRUCTIVE SLEEP APNEA): Primary | ICD-10-CM

## 2025-07-09 ENCOUNTER — OFFICE VISIT (OUTPATIENT)
Facility: HOSPITAL | Age: 40
End: 2025-07-09
Payer: COMMERCIAL

## 2025-07-09 VITALS
OXYGEN SATURATION: 98 % | DIASTOLIC BLOOD PRESSURE: 73 MMHG | SYSTOLIC BLOOD PRESSURE: 121 MMHG | WEIGHT: 196 LBS | HEIGHT: 73 IN | BODY MASS INDEX: 25.98 KG/M2 | HEART RATE: 92 BPM

## 2025-07-09 DIAGNOSIS — R56.9 NOCTURNAL SEIZURES: Primary | ICD-10-CM

## 2025-07-09 DIAGNOSIS — G47.61 PLMD (PERIODIC LIMB MOVEMENT DISORDER): ICD-10-CM

## 2025-07-09 DIAGNOSIS — G47.52 DREAM ENACTMENT BEHAVIOR: ICD-10-CM

## 2025-07-09 DIAGNOSIS — G47.33 OSA (OBSTRUCTIVE SLEEP APNEA): ICD-10-CM

## 2025-07-09 DIAGNOSIS — G47.59 OTHER PARASOMNIA: ICD-10-CM

## 2025-07-09 DIAGNOSIS — E61.1 IRON DEFICIENCY: ICD-10-CM

## 2025-07-09 DIAGNOSIS — F51.9 SEVERE MORNING SLEEP INERTIA: ICD-10-CM

## 2025-07-09 PROCEDURE — G0463 HOSPITAL OUTPT CLINIC VISIT: HCPCS
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
## 2025-07-10 DIAGNOSIS — G40.909 NOCTURNAL EPILEPSY: Primary | ICD-10-CM

## 2025-07-10 DIAGNOSIS — R56.9 NOCTURNAL SEIZURE: Primary | ICD-10-CM

## 2025-07-10 DIAGNOSIS — G47.33 OSA (OBSTRUCTIVE SLEEP APNEA): ICD-10-CM

## 2025-07-10 DIAGNOSIS — G47.52 DREAM ENACTMENT BEHAVIOR: ICD-10-CM

## 2025-07-10 DIAGNOSIS — G47.59 OTHER PARASOMNIA: ICD-10-CM

## 2025-07-10 DIAGNOSIS — R56.9 NOCTURNAL SEIZURES: Primary | ICD-10-CM

## 2025-07-10 RX ORDER — ENOXAPARIN SODIUM 100 MG/ML
30 INJECTION SUBCUTANEOUS DAILY
Status: DISCONTINUED | OUTPATIENT
Start: 2025-07-10 | End: 2025-07-10

## 2025-07-10 RX ORDER — SODIUM CHLORIDE 0.9 % (FLUSH) 0.9 %
10 SYRINGE (ML) INJECTION EVERY 12 HOURS SCHEDULED
Status: ACTIVE | OUTPATIENT
Start: 2025-07-10

## 2025-07-10 RX ORDER — SODIUM CHLORIDE 0.9 % (FLUSH) 0.9 %
10 SYRINGE (ML) INJECTION EVERY 12 HOURS SCHEDULED
Status: DISPENSED | OUTPATIENT
Start: 2025-07-10

## 2025-07-10 RX ORDER — SODIUM CHLORIDE 0.9 % (FLUSH) 0.9 %
10 SYRINGE (ML) INJECTION AS NEEDED
Status: ACTIVE | OUTPATIENT
Start: 2025-07-10

## 2025-07-10 RX ORDER — SODIUM CHLORIDE 9 MG/ML
40 INJECTION, SOLUTION INTRAVENOUS AS NEEDED
Status: SHIPPED | OUTPATIENT
Start: 2025-07-10

## 2025-07-10 RX ORDER — SODIUM CHLORIDE 9 MG/ML
40 INJECTION, SOLUTION INTRAVENOUS AS NEEDED
Status: ACTIVE | OUTPATIENT
Start: 2025-07-10

## 2025-07-10 RX ORDER — SODIUM CHLORIDE 9 MG/ML
40 INJECTION, SOLUTION INTRAVENOUS AS NEEDED
Status: DISCONTINUED | OUTPATIENT
Start: 2025-07-10 | End: 2025-07-10

## 2025-07-10 RX ORDER — ENOXAPARIN SODIUM 100 MG/ML
30 INJECTION SUBCUTANEOUS DAILY
Status: SHIPPED | OUTPATIENT
Start: 2025-07-10

## 2025-08-05 ENCOUNTER — APPOINTMENT (OUTPATIENT)
Dept: NEUROLOGY | Facility: HOSPITAL | Age: 40
End: 2025-08-05
Payer: COMMERCIAL

## 2025-08-05 ENCOUNTER — HOSPITAL ENCOUNTER (OUTPATIENT)
Dept: NEUROLOGY | Facility: HOSPITAL | Age: 40
Discharge: HOME | End: 2025-08-07
Attending: STUDENT IN AN ORGANIZED HEALTH CARE EDUCATION/TRAINING PROGRAM | Admitting: PSYCHIATRY & NEUROLOGY
Payer: COMMERCIAL

## 2025-08-05 DIAGNOSIS — R56.9 NOCTURNAL SEIZURE: ICD-10-CM

## 2025-08-05 PROBLEM — G40.909 EPILEPSY: Status: ACTIVE | Noted: 2025-08-05

## 2025-08-05 LAB
ALBUMIN SERPL-MCNC: 4.2 G/DL (ref 3.5–5.2)
ALBUMIN/GLOB SERPL: 1.6 G/DL
ALP SERPL-CCNC: 103 U/L (ref 39–117)
ALT SERPL W P-5'-P-CCNC: 39 U/L (ref 1–41)
AMPHET+METHAMPHET UR QL: POSITIVE
AMPHETAMINES UR QL: NEGATIVE
ANION GAP SERPL CALCULATED.3IONS-SCNC: 9 MMOL/L (ref 5–15)
AST SERPL-CCNC: 22 U/L (ref 1–40)
BARBITURATES UR QL SCN: NEGATIVE
BENZODIAZ UR QL SCN: NEGATIVE
BILIRUB SERPL-MCNC: 0.3 MG/DL (ref 0–1.2)
BUN SERPL-MCNC: 11 MG/DL (ref 6–20)
BUN/CREAT SERPL: 14.1 (ref 7–25)
BUPRENORPHINE SERPL-MCNC: NEGATIVE NG/ML
CALCIUM SPEC-SCNC: 9.1 MG/DL (ref 8.6–10.5)
CANNABINOIDS SERPL QL: NEGATIVE
CHLORIDE SERPL-SCNC: 105 MMOL/L (ref 98–107)
CO2 SERPL-SCNC: 25 MMOL/L (ref 22–29)
COCAINE UR QL: NEGATIVE
CREAT SERPL-MCNC: 0.78 MG/DL (ref 0.76–1.27)
DEPRECATED RDW RBC AUTO: 41.1 FL (ref 37–54)
EGFRCR SERPLBLD CKD-EPI 2021: 114.9 ML/MIN/1.73
ERYTHROCYTE [DISTWIDTH] IN BLOOD BY AUTOMATED COUNT: 11.9 % (ref 12.3–15.4)
FENTANYL UR-MCNC: NEGATIVE NG/ML
GLOBULIN UR ELPH-MCNC: 2.6 GM/DL
GLUCOSE SERPL-MCNC: 106 MG/DL (ref 65–99)
HCT VFR BLD AUTO: 42 % (ref 37.5–51)
HGB BLD-MCNC: 14 G/DL (ref 13–17.7)
MCH RBC QN AUTO: 31 PG (ref 26.6–33)
MCHC RBC AUTO-ENTMCNC: 33.3 G/DL (ref 31.5–35.7)
MCV RBC AUTO: 92.9 FL (ref 79–97)
METHADONE UR QL SCN: NEGATIVE
OPIATES UR QL: NEGATIVE
OXYCODONE UR QL SCN: NEGATIVE
PCP UR QL SCN: NEGATIVE
PLATELET # BLD AUTO: 266 10*3/MM3 (ref 140–450)
PMV BLD AUTO: 8.8 FL (ref 6–12)
POTASSIUM SERPL-SCNC: 4.6 MMOL/L (ref 3.5–5.2)
PROT SERPL-MCNC: 6.8 G/DL (ref 6–8.5)
RBC # BLD AUTO: 4.52 10*6/MM3 (ref 4.14–5.8)
SODIUM SERPL-SCNC: 139 MMOL/L (ref 136–145)
TRICYCLICS UR QL SCN: NEGATIVE
WBC NRBC COR # BLD AUTO: 9.3 10*3/MM3 (ref 3.4–10.8)

## 2025-08-05 PROCEDURE — 80307 DRUG TEST PRSMV CHEM ANLYZR: CPT | Performed by: STUDENT IN AN ORGANIZED HEALTH CARE EDUCATION/TRAINING PROGRAM

## 2025-08-05 PROCEDURE — 95716 VEEG EA 12-26HR CONT MNTR: CPT

## 2025-08-05 PROCEDURE — 25010000002 ENOXAPARIN PER 10 MG: Performed by: STUDENT IN AN ORGANIZED HEALTH CARE EDUCATION/TRAINING PROGRAM

## 2025-08-05 PROCEDURE — 85027 COMPLETE CBC AUTOMATED: CPT | Performed by: STUDENT IN AN ORGANIZED HEALTH CARE EDUCATION/TRAINING PROGRAM

## 2025-08-05 PROCEDURE — 80053 COMPREHEN METABOLIC PANEL: CPT | Performed by: STUDENT IN AN ORGANIZED HEALTH CARE EDUCATION/TRAINING PROGRAM

## 2025-08-05 RX ORDER — SODIUM CHLORIDE 9 MG/ML
40 INJECTION, SOLUTION INTRAVENOUS AS NEEDED
Status: DISCONTINUED | OUTPATIENT
Start: 2025-08-05 | End: 2025-08-07 | Stop reason: HOSPADM

## 2025-08-05 RX ORDER — DIAZEPAM 10 MG/2ML
5 INJECTION, SOLUTION INTRAMUSCULAR; INTRAVENOUS EVERY 4 HOURS PRN
Status: DISCONTINUED | OUTPATIENT
Start: 2025-08-05 | End: 2025-08-07 | Stop reason: HOSPADM

## 2025-08-05 RX ORDER — SODIUM CHLORIDE 0.9 % (FLUSH) 0.9 %
10 SYRINGE (ML) INJECTION AS NEEDED
Status: DISCONTINUED | OUTPATIENT
Start: 2025-08-05 | End: 2025-08-07 | Stop reason: HOSPADM

## 2025-08-05 RX ORDER — ENOXAPARIN SODIUM 100 MG/ML
30 INJECTION SUBCUTANEOUS DAILY
Status: DISCONTINUED | OUTPATIENT
Start: 2025-08-05 | End: 2025-08-07 | Stop reason: HOSPADM

## 2025-08-05 RX ORDER — SODIUM CHLORIDE 0.9 % (FLUSH) 0.9 %
10 SYRINGE (ML) INJECTION EVERY 12 HOURS SCHEDULED
Status: DISCONTINUED | OUTPATIENT
Start: 2025-08-05 | End: 2025-08-07 | Stop reason: HOSPADM

## 2025-08-05 RX ADMIN — ENOXAPARIN SODIUM 30 MG: 100 INJECTION SUBCUTANEOUS at 10:33

## 2025-08-05 RX ADMIN — Medication 10 ML: at 10:42

## 2025-08-05 RX ADMIN — Medication 10 ML: at 20:53

## 2025-08-06 ENCOUNTER — APPOINTMENT (OUTPATIENT)
Dept: NEUROLOGY | Facility: HOSPITAL | Age: 40
End: 2025-08-06
Payer: COMMERCIAL

## 2025-08-06 PROCEDURE — 95716 VEEG EA 12-26HR CONT MNTR: CPT

## 2025-08-06 PROCEDURE — 25010000002 ENOXAPARIN PER 10 MG: Performed by: STUDENT IN AN ORGANIZED HEALTH CARE EDUCATION/TRAINING PROGRAM

## 2025-08-06 RX ADMIN — Medication 10 ML: at 08:24

## 2025-08-06 RX ADMIN — ENOXAPARIN SODIUM 30 MG: 100 INJECTION SUBCUTANEOUS at 08:23

## 2025-08-06 RX ADMIN — Medication 10 ML: at 20:28

## 2025-08-07 ENCOUNTER — APPOINTMENT (OUTPATIENT)
Dept: NEUROLOGY | Facility: HOSPITAL | Age: 40
End: 2025-08-07
Payer: COMMERCIAL

## 2025-08-07 VITALS
BODY MASS INDEX: 25.68 KG/M2 | OXYGEN SATURATION: 100 % | TEMPERATURE: 98.4 F | SYSTOLIC BLOOD PRESSURE: 109 MMHG | HEIGHT: 73 IN | RESPIRATION RATE: 16 BRPM | HEART RATE: 67 BPM | DIASTOLIC BLOOD PRESSURE: 72 MMHG | WEIGHT: 193.78 LBS

## 2025-08-07 PROCEDURE — 95716 VEEG EA 12-26HR CONT MNTR: CPT

## 2025-08-07 PROCEDURE — 25010000002 ENOXAPARIN PER 10 MG: Performed by: STUDENT IN AN ORGANIZED HEALTH CARE EDUCATION/TRAINING PROGRAM

## 2025-08-07 RX ORDER — LAMOTRIGINE 25 MG/1
TABLET ORAL
Qty: 30 TABLET | Refills: 2 | Status: SHIPPED | OUTPATIENT
Start: 2025-08-07 | End: 2025-09-04

## 2025-08-07 RX ORDER — LAMOTRIGINE 25 MG/1
TABLET ORAL
Qty: 63 TABLET | Refills: 0 | Status: SHIPPED | OUTPATIENT
Start: 2025-08-07 | End: 2025-09-18

## 2025-08-07 RX ORDER — LAMOTRIGINE 25 MG/1
25 TABLET ORAL DAILY
Status: DISCONTINUED | OUTPATIENT
Start: 2025-08-07 | End: 2025-08-07 | Stop reason: HOSPADM

## 2025-08-07 RX ADMIN — Medication 10 ML: at 09:04

## 2025-08-07 RX ADMIN — LAMOTRIGINE 25 MG: 25 TABLET ORAL at 12:00

## 2025-08-07 RX ADMIN — ENOXAPARIN SODIUM 30 MG: 100 INJECTION SUBCUTANEOUS at 09:04
